# Patient Record
Sex: FEMALE | Race: WHITE | NOT HISPANIC OR LATINO | Employment: UNEMPLOYED | URBAN - METROPOLITAN AREA
[De-identification: names, ages, dates, MRNs, and addresses within clinical notes are randomized per-mention and may not be internally consistent; named-entity substitution may affect disease eponyms.]

---

## 2023-05-15 DIAGNOSIS — E03.9 HYPOTHYROIDISM, UNSPECIFIED: ICD-10-CM

## 2023-05-15 RX ORDER — LIOTHYRONINE SODIUM 5 UG/1
TABLET ORAL
Qty: 120 TABLET | Refills: 2 | Status: SHIPPED | OUTPATIENT
Start: 2023-05-15 | End: 2023-07-12

## 2023-05-15 RX ORDER — LIOTHYRONINE SODIUM 5 UG/1
1 TABLET ORAL 4 TIMES DAILY
COMMUNITY
Start: 2017-12-07 | End: 2023-10-06 | Stop reason: SDUPTHER

## 2023-05-20 DIAGNOSIS — F41.9 ANXIETY: Primary | ICD-10-CM

## 2023-05-22 RX ORDER — DULOXETIN HYDROCHLORIDE 30 MG/1
CAPSULE, DELAYED RELEASE ORAL
Qty: 180 CAPSULE | Refills: 2 | Status: SHIPPED | OUTPATIENT
Start: 2023-05-22 | End: 2023-05-25 | Stop reason: SDUPTHER

## 2023-05-22 RX ORDER — DULOXETIN HYDROCHLORIDE 60 MG/1
1 CAPSULE, DELAYED RELEASE ORAL DAILY
COMMUNITY
Start: 2020-10-22 | End: 2023-07-12

## 2023-05-24 DIAGNOSIS — F41.9 ANXIETY: ICD-10-CM

## 2023-05-25 DIAGNOSIS — Z00.00 ENCOUNTER FOR HEALTH MAINTENANCE EXAMINATION: ICD-10-CM

## 2023-05-25 DIAGNOSIS — Z12.31 ENCOUNTER FOR SCREENING MAMMOGRAM FOR MALIGNANT NEOPLASM OF BREAST: ICD-10-CM

## 2023-05-30 RX ORDER — DULOXETIN HYDROCHLORIDE 30 MG/1
30 CAPSULE, DELAYED RELEASE ORAL 2 TIMES DAILY
Qty: 180 CAPSULE | Refills: 2 | Status: SHIPPED | OUTPATIENT
Start: 2023-05-30 | End: 2024-03-22 | Stop reason: ALTCHOICE

## 2023-07-12 DIAGNOSIS — G47.00 INSOMNIA, UNSPECIFIED: ICD-10-CM

## 2023-07-12 PROBLEM — H16.229 KERATOCONJUNCTIVITIS SICCA, NOT SPECIFIED AS SJOGREN'S: Status: ACTIVE | Noted: 2023-07-12

## 2023-07-12 PROBLEM — M75.81 TENDINITIS OF RIGHT ROTATOR CUFF: Status: RESOLVED | Noted: 2023-07-12 | Resolved: 2023-07-12

## 2023-07-12 PROBLEM — M25.561 RIGHT KNEE PAIN: Status: RESOLVED | Noted: 2023-07-12 | Resolved: 2023-07-12

## 2023-07-12 PROBLEM — R82.998 CASTS URINARY HYALINE: Status: RESOLVED | Noted: 2023-07-12 | Resolved: 2023-07-12

## 2023-07-12 PROBLEM — H52.03 HYPEROPIA OF BOTH EYES: Status: ACTIVE | Noted: 2023-07-12

## 2023-07-12 PROBLEM — H92.09 OTALGIA: Status: ACTIVE | Noted: 2022-08-26

## 2023-07-12 PROBLEM — R53.82 CHRONIC FATIGUE: Status: RESOLVED | Noted: 2023-07-12 | Resolved: 2023-07-12

## 2023-07-12 PROBLEM — E55.9 MILD VITAMIN D DEFICIENCY: Status: ACTIVE | Noted: 2023-07-12

## 2023-07-12 PROBLEM — M17.12 OSTEOARTHRITIS OF LEFT KNEE: Status: ACTIVE | Noted: 2023-07-12

## 2023-07-12 PROBLEM — M25.511 SHOULDER PAIN, RIGHT: Status: RESOLVED | Noted: 2023-07-12 | Resolved: 2023-07-12

## 2023-07-12 PROBLEM — I10 HYPERTENSION: Status: ACTIVE | Noted: 2023-07-12

## 2023-07-12 PROBLEM — R73.09 ELEVATED GLYCOHEMOGLOBIN: Status: ACTIVE | Noted: 2023-07-12

## 2023-07-12 PROBLEM — H90.3 BILATERAL SENSORINEURAL HEARING LOSS: Status: ACTIVE | Noted: 2023-07-12

## 2023-07-12 PROBLEM — G47.9 SLEEP DISTURBANCE: Status: ACTIVE | Noted: 2023-07-12

## 2023-07-12 PROBLEM — H52.4 BILATERAL PRESBYOPIA: Status: RESOLVED | Noted: 2023-07-12 | Resolved: 2023-07-12

## 2023-07-12 PROBLEM — M10.9 GOUT: Status: ACTIVE | Noted: 2022-08-26

## 2023-07-12 PROBLEM — D12.6 ADENOMATOUS POLYP OF COLON: Status: RESOLVED | Noted: 2023-07-12 | Resolved: 2023-07-12

## 2023-07-12 PROBLEM — E87.5 HYPERKALEMIA: Status: ACTIVE | Noted: 2023-07-12

## 2023-07-12 RX ORDER — DICLOFENAC SODIUM 10 MG/G
GEL TOPICAL
COMMUNITY
Start: 2023-01-22 | End: 2024-03-22 | Stop reason: SDUPTHER

## 2023-07-12 RX ORDER — LIFITEGRAST 50 MG/ML
SOLUTION/ DROPS OPHTHALMIC 2 TIMES DAILY
COMMUNITY
Start: 2023-04-27 | End: 2024-03-22 | Stop reason: SDUPTHER

## 2023-07-12 RX ORDER — LEVOTHYROXINE SODIUM 125 UG/1
125 TABLET ORAL DAILY
COMMUNITY
Start: 2023-04-19 | End: 2024-04-15

## 2023-07-12 RX ORDER — TRAZODONE HYDROCHLORIDE 50 MG/1
25 TABLET ORAL NIGHTLY
COMMUNITY
End: 2023-10-06 | Stop reason: SDUPTHER

## 2023-07-12 RX ORDER — ALLOPURINOL 300 MG/1
300 TABLET ORAL DAILY
COMMUNITY
End: 2023-10-06 | Stop reason: ALTCHOICE

## 2023-07-13 RX ORDER — TRAZODONE HYDROCHLORIDE 50 MG/1
TABLET ORAL
Qty: 45 TABLET | Refills: 3 | Status: SHIPPED | OUTPATIENT
Start: 2023-07-13 | End: 2024-07-12

## 2023-09-23 LAB
ALANINE AMINOTRANSFERASE (SGPT) (U/L) IN SER/PLAS: 24 U/L (ref 7–45)
ALBUMIN (G/DL) IN SER/PLAS: 4.2 G/DL (ref 3.4–5)
ALKALINE PHOSPHATASE (U/L) IN SER/PLAS: 93 U/L (ref 33–110)
ANION GAP IN SER/PLAS: 13 MMOL/L (ref 10–20)
ASPARTATE AMINOTRANSFERASE (SGOT) (U/L) IN SER/PLAS: 24 U/L (ref 9–39)
BILIRUBIN TOTAL (MG/DL) IN SER/PLAS: 0.6 MG/DL (ref 0–1.2)
CALCIDIOL (25 OH VITAMIN D3) (NG/ML) IN SER/PLAS: 53 NG/ML
CALCIUM (MG/DL) IN SER/PLAS: 9.8 MG/DL (ref 8.6–10.6)
CARBON DIOXIDE, TOTAL (MMOL/L) IN SER/PLAS: 29 MMOL/L (ref 21–32)
CHLORIDE (MMOL/L) IN SER/PLAS: 103 MMOL/L (ref 98–107)
CHOLESTEROL (MG/DL) IN SER/PLAS: 282 MG/DL (ref 0–199)
CHOLESTEROL IN HDL (MG/DL) IN SER/PLAS: 41.2 MG/DL
CHOLESTEROL/HDL RATIO: 6.8
CREATININE (MG/DL) IN SER/PLAS: 0.67 MG/DL (ref 0.5–1.05)
ERYTHROCYTE DISTRIBUTION WIDTH (RATIO) BY AUTOMATED COUNT: 13.2 % (ref 11.5–14.5)
ERYTHROCYTE MEAN CORPUSCULAR HEMOGLOBIN CONCENTRATION (G/DL) BY AUTOMATED: 32.2 G/DL (ref 32–36)
ERYTHROCYTE MEAN CORPUSCULAR VOLUME (FL) BY AUTOMATED COUNT: 92 FL (ref 80–100)
ERYTHROCYTES (10*6/UL) IN BLOOD BY AUTOMATED COUNT: 4.52 X10E12/L (ref 4–5.2)
ESTIMATED AVERAGE GLUCOSE FOR HBA1C: 108 MG/DL
GFR FEMALE: >90 ML/MIN/1.73M2
GLUCOSE (MG/DL) IN SER/PLAS: 103 MG/DL (ref 74–99)
HEMATOCRIT (%) IN BLOOD BY AUTOMATED COUNT: 41.6 % (ref 36–46)
HEMOGLOBIN (G/DL) IN BLOOD: 13.4 G/DL (ref 12–16)
HEMOGLOBIN A1C/HEMOGLOBIN TOTAL IN BLOOD: 5.4 %
LDL: 197 MG/DL (ref 0–99)
LEUKOCYTES (10*3/UL) IN BLOOD BY AUTOMATED COUNT: 6.3 X10E9/L (ref 4.4–11.3)
NON HDL CHOLESTEROL: 241 MG/DL
NRBC (PER 100 WBCS) BY AUTOMATED COUNT: 0 /100 WBC (ref 0–0)
PLATELETS (10*3/UL) IN BLOOD AUTOMATED COUNT: 279 X10E9/L (ref 150–450)
POTASSIUM (MMOL/L) IN SER/PLAS: 4.7 MMOL/L (ref 3.5–5.3)
PROTEIN TOTAL: 6.4 G/DL (ref 6.4–8.2)
SODIUM (MMOL/L) IN SER/PLAS: 140 MMOL/L (ref 136–145)
THYROTROPIN (MIU/L) IN SER/PLAS BY DETECTION LIMIT <= 0.05 MIU/L: 0.33 MIU/L (ref 0.44–3.98)
TRIGLYCERIDE (MG/DL) IN SER/PLAS: 218 MG/DL (ref 0–149)
UREA NITROGEN (MG/DL) IN SER/PLAS: 13 MG/DL (ref 6–23)
VLDL: 44 MG/DL (ref 0–40)

## 2023-10-06 ENCOUNTER — OFFICE VISIT (OUTPATIENT)
Dept: PRIMARY CARE | Facility: CLINIC | Age: 58
End: 2023-10-06
Payer: COMMERCIAL

## 2023-10-06 VITALS
BODY MASS INDEX: 36.68 KG/M2 | SYSTOLIC BLOOD PRESSURE: 120 MMHG | DIASTOLIC BLOOD PRESSURE: 80 MMHG | HEART RATE: 106 BPM | WEIGHT: 207 LBS | HEIGHT: 63 IN | OXYGEN SATURATION: 98 % | TEMPERATURE: 97.4 F

## 2023-10-06 DIAGNOSIS — E03.9 ACQUIRED HYPOTHYROIDISM: Primary | ICD-10-CM

## 2023-10-06 DIAGNOSIS — Z00.00 ROUTINE GENERAL MEDICAL EXAMINATION AT A HEALTH CARE FACILITY: ICD-10-CM

## 2023-10-06 DIAGNOSIS — M1A.0790 IDIOPATHIC CHRONIC GOUT OF ANKLE WITHOUT TOPHUS, UNSPECIFIED LATERALITY: ICD-10-CM

## 2023-10-06 DIAGNOSIS — Z23 FLU VACCINE NEED: ICD-10-CM

## 2023-10-06 PROBLEM — M79.7 FIBROMYALGIA: Status: ACTIVE | Noted: 2022-08-26

## 2023-10-06 PROCEDURE — 3074F SYST BP LT 130 MM HG: CPT | Performed by: FAMILY MEDICINE

## 2023-10-06 PROCEDURE — 3079F DIAST BP 80-89 MM HG: CPT | Performed by: FAMILY MEDICINE

## 2023-10-06 PROCEDURE — 99396 PREV VISIT EST AGE 40-64: CPT | Performed by: FAMILY MEDICINE

## 2023-10-06 PROCEDURE — 90471 IMMUNIZATION ADMIN: CPT | Performed by: FAMILY MEDICINE

## 2023-10-06 PROCEDURE — 90686 IIV4 VACC NO PRSV 0.5 ML IM: CPT | Performed by: FAMILY MEDICINE

## 2023-10-06 RX ORDER — CELECOXIB 200 MG/1
200 CAPSULE ORAL DAILY
Qty: 90 CAPSULE | Refills: 1 | Status: SHIPPED | OUTPATIENT
Start: 2023-10-06 | End: 2024-01-16

## 2023-10-06 RX ORDER — LIOTHYRONINE SODIUM 5 UG/1
5 TABLET ORAL 3 TIMES DAILY
Qty: 270 TABLET | Refills: 1 | Status: SHIPPED | OUTPATIENT
Start: 2023-10-06

## 2023-10-06 ASSESSMENT — PAIN SCALES - GENERAL: PAINLEVEL: 4

## 2023-10-06 ASSESSMENT — ENCOUNTER SYMPTOMS
DIARRHEA: 0
ABDOMINAL PAIN: 0
WEAKNESS: 0
BLOOD IN STOOL: 0
EYE PAIN: 0
PALPITATIONS: 0
HALLUCINATIONS: 0
MYALGIAS: 1
FREQUENCY: 0
HEADACHES: 0
FATIGUE: 0
DYSURIA: 0
COUGH: 0
HEMATURIA: 0
TROUBLE SWALLOWING: 0
ARTHRALGIAS: 1
JOINT SWELLING: 0
UNEXPECTED WEIGHT CHANGE: 0
DECREASED CONCENTRATION: 0
SORE THROAT: 0
NAUSEA: 0
SHORTNESS OF BREATH: 0
CONFUSION: 0
FEVER: 0
VOMITING: 0
DIZZINESS: 0
NUMBNESS: 0

## 2023-10-06 ASSESSMENT — PATIENT HEALTH QUESTIONNAIRE - PHQ9
SUM OF ALL RESPONSES TO PHQ9 QUESTIONS 1 AND 2: 0
1. LITTLE INTEREST OR PLEASURE IN DOING THINGS: NOT AT ALL
2. FEELING DOWN, DEPRESSED OR HOPELESS: NOT AT ALL

## 2023-10-06 NOTE — PROGRESS NOTES
Subjective   Patient ID: Parvin Garibay is a 58 y.o. female.    Patient comes in today for physical exam.  There has been no chest pain, shortness of breath, fever, chills, unexplained weight loss, rectal bleeding or any other unusual symptoms.  BMI 36.67. Has chronic medical issues, reviewed.  Hx fibromyalgia, tapering off cymbalta, slowly.  Recent labs, diagnostics and pertinent information has been reviewed. Patient is attempting to eat a healthy diet and incorporate exercise in to their lifestyle. Patient does not smoke. Alcohol in moderation. Patient is practicing routine eye and dental care. Reviewed employment status. No uncontrolled anxiety, depression. Reviewed current medications, if any. Hx gout, wants to stop allopurinol.          Review of Systems   Constitutional:  Negative for fatigue, fever and unexpected weight change.   HENT:  Negative for congestion, ear pain, hearing loss, sore throat and trouble swallowing.    Eyes:  Negative for pain and visual disturbance.   Respiratory:  Negative for cough and shortness of breath.    Cardiovascular:  Negative for chest pain, palpitations and leg swelling.   Gastrointestinal:  Negative for abdominal pain, blood in stool, diarrhea, nausea and vomiting.   Genitourinary:  Negative for dysuria, frequency, hematuria and urgency.   Musculoskeletal:  Positive for arthralgias and myalgias. Negative for joint swelling.   Skin:  Negative for pallor and rash.   Neurological:  Negative for dizziness, syncope, weakness, numbness and headaches.   Psychiatric/Behavioral:  Negative for confusion, decreased concentration, hallucinations and suicidal ideas.      Vitals:    10/06/23 0757   BP: 120/80   Pulse: 106   Temp: 36.3 °C (97.4 °F)   SpO2: 98%      Objective   Physical Exam  Constitutional:       Appearance: Normal appearance. She is obese.   HENT:      Head: Normocephalic and atraumatic.      Right Ear: Tympanic membrane and external ear normal.      Left Ear: Tympanic  membrane and external ear normal.      Nose: Nose normal.      Mouth/Throat:      Mouth: Mucous membranes are moist.      Pharynx: Oropharynx is clear. No oropharyngeal exudate.   Eyes:      Extraocular Movements: Extraocular movements intact.      Conjunctiva/sclera: Conjunctivae normal.      Pupils: Pupils are equal, round, and reactive to light.   Cardiovascular:      Rate and Rhythm: Normal rate and regular rhythm.      Heart sounds: Normal heart sounds.   Pulmonary:      Effort: Pulmonary effort is normal.      Breath sounds: Normal breath sounds.   Abdominal:      General: Abdomen is flat.      Palpations: Abdomen is soft. There is no mass.      Tenderness: There is no abdominal tenderness. There is no guarding.   Musculoskeletal:      Cervical back: Neck supple.   Lymphadenopathy:      Cervical: No cervical adenopathy.   Skin:     General: Skin is warm and dry.   Neurological:      General: No focal deficit present.      Mental Status: She is alert.   Psychiatric:         Mood and Affect: Mood normal.         Speech: Speech normal.         Behavior: Behavior normal.         Cognition and Memory: Cognition normal.         Assessment/Plan   Diagnoses and all orders for this visit:  Acquired hypothyroidism  -     liothyronine (Cytomel) 5 mcg tablet; Take 1 tablet (5 mcg) by mouth 3 times a day.  -     TSH; Future  -     Colonoscopy Screening; Future  Idiopathic chronic gout of ankle without tophus, unspecified laterality  -     celecoxib (CeleBREX) 200 mg capsule; Take 1 capsule (200 mg) by mouth once daily.  Routine general medical examination at a health care facility  Flu vaccine need  -     Flu vaccine (IIV4) age 6 months and greater, preservative free

## 2023-10-06 NOTE — PATIENT INSTRUCTIONS
It was nice to see you today!  Discussed current concerns and addressed   Reviewed recent labs and diagnostics  Reviewed medications list  Continue to eat a healthy diet, exercise at least 3 times a week or more  Plan and follow up discussed  For any further information related to your condition, copy and paste or go to [email or web address]  Flu shot  Stop allopurinol  Start black cherry capsules, gout diet

## 2023-11-06 ENCOUNTER — TELEPHONE (OUTPATIENT)
Dept: PRIMARY CARE | Facility: CLINIC | Age: 58
End: 2023-11-06
Payer: COMMERCIAL

## 2023-11-06 NOTE — TELEPHONE ENCOUNTER
Nurys from Speech Therapy dept at [facility]-  Patient contacted her to start therapy for Cognition and Memory issues-it was recommended by her Mental health therapist but they need a referral from her primary care provider to be able to do this for her. Would like to know if Dr. Gupta would be willing to write the referral?     Phone [phone]    If so fax to [phone] Attn: Nurys

## 2023-11-08 DIAGNOSIS — R41.89 COGNITIVE CHANGES: Primary | ICD-10-CM

## 2023-11-09 DIAGNOSIS — Z00.00 ROUTINE GENERAL MEDICAL EXAMINATION AT A HEALTH CARE FACILITY: ICD-10-CM

## 2023-11-18 DIAGNOSIS — M10.9 GOUT, UNSPECIFIED: ICD-10-CM

## 2023-11-20 RX ORDER — ALLOPURINOL 300 MG/1
300 TABLET ORAL DAILY
Qty: 90 TABLET | Refills: 3 | Status: SHIPPED | OUTPATIENT
Start: 2023-11-20

## 2023-12-02 ENCOUNTER — LAB (OUTPATIENT)
Dept: LAB | Facility: LAB | Age: 58
End: 2023-12-02
Payer: COMMERCIAL

## 2023-12-02 DIAGNOSIS — E03.9 ACQUIRED HYPOTHYROIDISM: ICD-10-CM

## 2023-12-02 LAB — TSH SERPL-ACNC: 0.3 MIU/L (ref 0.44–3.98)

## 2023-12-02 PROCEDURE — 36415 COLL VENOUS BLD VENIPUNCTURE: CPT

## 2023-12-02 PROCEDURE — 84443 ASSAY THYROID STIM HORMONE: CPT

## 2024-01-02 NOTE — PROGRESS NOTES
Patient: Parvin Garibay    [id number]  : 1965 -- AGE 58 y.o.    Provider: Nabeel Edwards MD PhD     Location  [facility]   Service Date: 1/3/2024              [facility]  New Visit Note      HISTORY OF PRESENT ILLNESS     The patient's referring provider is: Mayela Gupta MD     REASON FOR VISIT: No chief complaint on file.       HISTORY OF PRESENT ILLNESS   Ms. Parvin Garibay is a 58 y.o. female with past medical history of Sjogren's, gout, HLD, HTN, hypothyroid, fibromyalgia who presents to a [facility] for a sleep medicine evaluation with concerns of sleep apnea.     PAST SLEEP HISTORY  She had a sleep study in  but we do not have results, but there is some mention in clinic notes. There may have been a PSG on 14 that showed moderate WHITNEY with an AHI of 26.6/h. A CPAP study was done on 5/20/15 but patient only seemed to use CPAP for a brief period and study was terminated.      CURRENT HISTORY  On today's visit, the patient reports a prior dx of WHITNEY. She feels her CPAP could be. Her wife has WHITNEY and is using CPAP.  She currently snores. It will get worse when cogested. Witnessed apneas have not been seen. Some choking/gasping episodes as well.     Over the past five years, the patient's weight has  not significantly changed.    She does take trazodone at 25mg to help her sleep. Helps with sleep onset. She has AM symptoms of grogginess with it.    Sleep schedule:  In bed: 9-10pm  Activities in bed:   Bedtime Activities: watch TV; sometimes read  Subjective sleep latency:  30-60 min  Awakenings during night: 1-2x for BR  Length of awakenings: < 15 min unless after 4AM  Final awakening time: 6AM (sometimes 3-4AM) - wakes on own  Out of bed: will get up when wakes up  Overall estimate of total sleep time: 6-7h    Weekends: similar  Preferred sleeping position: prone  Typically sleeps  with her wife .       Associated  sleep symptoms:  Breathing during sleep: snoring  Behaviors at night: No   Sleep paralysis: No   Hypnogogic or hypnopompic hallucinations: No   Cataplexy: No     Leg symptoms and timing:  - Sensations: Patient does not have unusual sensations in their extremities that cause an urge to move them       Sleep environment:  Pets in bed :  No - moved dog out of BR  Bedroom temperature: WNL  Noise :   No   Issues with bed comfort :   No     Daytime Symptoms:  On awakening patient reports: feels sleepy, morning dry mouth, and hard to get out of bed    Daytime: During the day, lack of sleep makes her more prone to errors. She is an ..    Driving History: she has a 30m commute from home to work. The patient typically drives to work.  She is not a . With driving, the patient denies sleepy driving, with 0 sleepiness-related motor vehicle accidents and 0 near misses.      ESS: 3  MARTY: 13  FOSQ:  27      REVIEW OF SYSTEMS     REVIEW OF SYSTEMS  Review of Systems   All other systems reviewed and are negative.      ALLERGIES AND MEDICATIONS     ALLERGIES  Allergies   Allergen Reactions    Rosuvastatin Other    Simvastatin Other    Statins-Hmg-Coa Reductase Inhibitors Other    Penicillins Rash    Sulfa (Sulfonamide Antibiotics) Rash    Sulfadiazine Rash       MEDICATIONS  Current Outpatient Medications   Medication Sig Dispense Refill    allopurinol (Zyloprim) 300 mg tablet TAKE 1 TABLET BY MOUTH EVERY DAY 90 tablet 3    celecoxib (CeleBREX) 200 mg capsule Take 1 capsule (200 mg) by mouth once daily. 90 capsule 1    diclofenac sodium (Voltaren) 1 % gel gel APPLY A SMALL AMOUNT TO THE PAINFUL AREA 3 TIMES A DAY AS NEEDED      DULoxetine (Cymbalta) 30 mg DR capsule Take 1 capsule (30 mg) by mouth 2 times a day. Do not crush or chew. 180 capsule 2    levothyroxine (Synthroid, Levoxyl) 125 mcg tablet Take 1 tablet (125 mcg) by mouth once daily.      liothyronine (Cytomel) 5 mcg tablet Take 1 tablet (5 mcg)  "by mouth 3 times a day. 270 tablet 1    traZODone (Desyrel) 50 mg tablet TAKE 1/2 TABLET BY MOUTH AT BEDTIME 45 tablet 3    Xiidra 5 % dropperette Administer into affected eye(s) twice a day.       No current facility-administered medications for this visit.         PAST HISTORY     PAST MEDICAL HISTORY  She  has a past medical history of Adenomatous polyp of colon (07/12/2023), Autoimmune thyroiditis (08/11/2014), Bilateral presbyopia (07/12/2023), Casts urinary hyaline (07/12/2023), Dysthymic disorder (08/06/2014), Encounter for other screening for malignant neoplasm of breast (10/22/2020), Other conditions influencing health status (06/18/2015), Personal history of other diseases of the respiratory system, Personal history of other endocrine, nutritional and metabolic disease (06/18/2015), Personal history of other endocrine, nutritional and metabolic disease, Personal history of other endocrine, nutritional and metabolic disease, Right knee pain (07/12/2023), and Tendinitis of right rotator cuff (07/12/2023).      PAST SURGICAL HISTORY:  Past Surgical History:   Procedure Laterality Date    OTHER SURGICAL HISTORY  04/05/2019    No history of surgery       FAMILY HISTORY  No family history on file.  She does not have a family history of sleep disorder (e.g., sleep apnea, narcolepsy in any first degree relatives). Father was a loud snorer but never tested      SOCIAL HISTORY  She  reports that she has quit smoking. Her smoking use included cigarettes. She has never used smokeless tobacco. No history on file for alcohol use and drug use. She currently lives with a partner. She works as an .    Caffeine consumption: Yesm- 2/day  Alcohol consumption: No  Smoking: No - quit 2013  Marijuana: No      PHYSICAL EXAM     Physical Examination: /81   Pulse 81   Temp 36.4 °C (97.5 °F)   Resp 18   Ht 1.6 m (5' 3\")   Wt 122 kg (270 lb)   SpO2 96%   BMI 47.83 kg/m²     PREVIOUS WEIGHTS:  Wt Readings " "from Last 3 Encounters:   01/03/24 122 kg (270 lb)   10/06/23 93.9 kg (207 lb)   01/27/23 113 kg (250 lb)       General: The patient is a pleasant female, in no acute distress. HEENT: She has a  a modified Mallampati grade 3 airway with Numbers; 0-4 (with +): 1+ tonsils bilaterally. The soft palate was normal and the uvula was normal. The A/P diameter of the velopharynx was narrowed. The oropharynx was not shallow in the A/P diameter. Lateral wall narrowing was present. Tongue ridging was notpresent. Erythema of the posterior pharynx was not present. Mucosal hypertrophy in the posterior oropharynx was not present. Retrognathia was not and micrognathia was not present. Neck: The neck was not enlarged. No JVP, bruits or lymphadenopathy was appreciated. Chest: Clear to auscultation. No wheezes, rales, or rhonchi. Cardiovascular: Regular rate and rhythm. No murmurs, gallops, or clicks. Abdomen: Soft, nontender, nondistended. Positive bowel sounds. Extremities: No clubbing, cyanosis, or edema is noted. Neurologic exam: Alert, oriented x3 and was grossly non-focal.    RESULTS/DATA     No results found for: \"IRON\", \"TRANSFERRIN\", \"IRONSAT\", \"TIBC\", \"FERRITIN\"    Bicarbonate (mmol/L)   Date Value   09/23/2023 29   06/20/2022 30   01/04/2022 31       DIAGNOSES     Problem List and Orders  Diagnoses and all orders for this visit:  Chronic insomnia  WHITNEY (obstructive sleep apnea)  -     In-Center Sleep Study (Sleep Provider Only); Future  -     Follow Up In Adult Sleep Medicine; Future  Morbid obesity (CMS/LTAC, located within [facility])        ASSESSMENT/PLAN     Ms. Garibay is a 58 y.o. female and with past medical history of Sjogren's, gout, HLD, HTN, hypothyroid, fibromyalgia She presents to  the [facility] to address her tiredness.    Snoring, possible WHITNEY. Ms. Garibay is evaluated for loud snoring in the context of an elevated body mass index.  This raises concerns for obstructive sleep apnea (WHTINEY). Given this, she " should be scheduled for an overnight sleep study. The causes and potential consequences of WHITNEY were discussed in detail with the patient.      We discussed what a sleep study (PSG) involves. She was informed that if severe WHITNEY was identified, the sleep study would include a CPAP titration. The patient was informed that if less severe WHITNEY is identified, that a second sleep study may be needed for a PAP titration study. If PAP is started for a diagnosis of WHITNEY,  She  was informed that she would need to return to the clinic for a follow-up in 1-3 months to assess initial response to PAP, address any PAP-related issue, and document PAP adherence. Other treatments including weight loss, positional therapy, surgical therapy, and oral appliances were also discussed.     Chronic insomnia.  She has had issues with difficulties falling and staying asleep.  She has been using trazodone which helps her grogginess in the morning.  Fall asleep but has some residual she has cut back down to 25 mg.  If she does not have significant sleep apnea then we might consider whether she should be referred for cognitive behavioral therapy or consideration for another sleep aid.      Obesity/Overweight.  The patient was counseled that her weight is the strongest modifiable risk factor and contributor for WHITNEY. She was counseled to consider weight loss options to include changes in dietary habits and activity. We briefly discussed the use of calorie tracking smartphone apps and the use of activity meters to provide feedback that helps in losing weight.  Before initiating an exercise plan, she should carefully review the approach with her primary care provider.       Follow up: after sleep study         Nabeel Edwards MD PhD

## 2024-01-03 ENCOUNTER — OFFICE VISIT (OUTPATIENT)
Dept: SLEEP MEDICINE | Facility: CLINIC | Age: 59
End: 2024-01-03
Payer: COMMERCIAL

## 2024-01-03 VITALS
BODY MASS INDEX: 47.84 KG/M2 | SYSTOLIC BLOOD PRESSURE: 149 MMHG | WEIGHT: 270 LBS | HEIGHT: 63 IN | OXYGEN SATURATION: 96 % | HEART RATE: 81 BPM | RESPIRATION RATE: 18 BRPM | TEMPERATURE: 97.5 F | DIASTOLIC BLOOD PRESSURE: 81 MMHG

## 2024-01-03 DIAGNOSIS — E66.01 MORBID OBESITY (MULTI): ICD-10-CM

## 2024-01-03 DIAGNOSIS — F51.04 CHRONIC INSOMNIA: Primary | ICD-10-CM

## 2024-01-03 DIAGNOSIS — G47.33 OSA (OBSTRUCTIVE SLEEP APNEA): ICD-10-CM

## 2024-01-03 PROCEDURE — 99204 OFFICE O/P NEW MOD 45 MIN: CPT | Performed by: INTERNAL MEDICINE

## 2024-01-03 PROCEDURE — 3079F DIAST BP 80-89 MM HG: CPT | Performed by: INTERNAL MEDICINE

## 2024-01-03 PROCEDURE — 1036F TOBACCO NON-USER: CPT | Performed by: INTERNAL MEDICINE

## 2024-01-03 PROCEDURE — 3077F SYST BP >= 140 MM HG: CPT | Performed by: INTERNAL MEDICINE

## 2024-01-03 PROCEDURE — 99214 OFFICE O/P EST MOD 30 MIN: CPT | Performed by: INTERNAL MEDICINE

## 2024-01-03 ASSESSMENT — SLEEP AND FATIGUE QUESTIONNAIRES
HOW LIKELY ARE YOU TO NOD OFF OR FALL ASLEEP WHILE SITTING AND TALKING TO SOMEONE: NO CHANCE OF DOZING
ESS TOTAL SCORE: 3
HOW LIKELY ARE YOU TO NOD OFF OR FALL ASLEEP WHILE LYING DOWN TO REST IN THE AFTERNOON WHEN CIRCUMSTANCES PERMIT: HIGH CHANCE OF DOZING
HOW LIKELY ARE YOU TO NOD OFF OR FALL ASLEEP WHILE SITTING QUIETLY AFTER LUNCH WITHOUT ALCOHOL: NO CHANCE OF DOZING
SITING INACTIVE IN A PUBLIC PLACE LIKE A CLASS ROOM OR A MOVIE THEATER: NO CHANCE OF DOZING
HOW LIKELY ARE YOU TO NOD OFF OR FALL ASLEEP IN A CAR, WHILE STOPPED FOR A FEW MINUTES IN TRAFFIC: NO CHANCE OF DOZING
HOW LIKELY ARE YOU TO NOD OFF OR FALL ASLEEP WHEN YOU ARE A PASSENGER IN A CAR FOR AN HOUR WITHOUT A BREAK: NO CHANCE OF DOZING
HOW LIKELY ARE YOU TO NOD OFF OR FALL ASLEEP WHILE WATCHING TV: NO CHANCE OF DOZING
HOW LIKELY ARE YOU TO NOD OFF OR FALL ASLEEP WHILE SITTING AND READING: NO CHANCE OF DOZING

## 2024-01-03 ASSESSMENT — PAIN SCALES - GENERAL: PAINLEVEL: 0-NO PAIN

## 2024-01-03 NOTE — PATIENT INSTRUCTIONS
[facility]  [facility]   [facility]  [address] [address]    [facility]  [address]  [facility] [id number] [phone]   [facility]  [address]  [facility] [address]           NAME: Parvin Garibay   DATE: 1/3/2024     Your Sleep Provider Today: Nabeel Edwards MD PhD  Your Primary Care Physician: Mayela Gupta MD   Your Referring Provider: No ref. provider found    Thank you for coming to the Sleep Medicine Clinic today! Your sleep medicine provider today was: Nabeel Edwards MD PhD Below is a summary of your treatment plan, other important information, and our contact numbers:  If you need to schedule an appointment, please call [phone])  If you need general assistance (e.g. forms completed, general questions), please call my , Sole, at [phone].  If you have a medical question about your sleep issues, please contact our nurses, Lissett or Krystle at [phone].   You can also contact us through [employer].      DIAGNOSIS:   1. Chronic insomnia        2. WHITNEY (obstructive sleep apnea)                TREATMENT PLAN     Scheduling a Sleep Study    Call the  Sleep Testing Center to speak with a sleep testing  to book your overnight sleep study procedure at one of our adult and pediatric-friendly sleep labs. Overnight sleep studies may be scheduled on a weekday or weekend.    We have child life services on a case by case basis at the [facility] location. We also perform daytime testing for shift workers on a case by case basis.    Locations for sleep studies are: [place], [place], [facility] ([facility]), [place], [place], [facility], [place], [facility], [place], [place].    Bring your usual medications and nightly routine items for your sleep study. In order to fall asleep faster in sleep lab, we  advise patients to wake up earlier on the morning of the scheduled testing and avoid napping prior to testing. Sometimes, your provider may prescribe a sleep aid to be taken at lights out in the sleep lab. If you are taking a sleep aid, please have somebody pick you up after the sleep testing.    Results of your sleep study will be given to the ordering clinician. Please contact their office for results or follow up as directed by your clinician.    For additional information about the sleep medicine services, please call [phone]       Follow-up Appointment:   Followup with me in after sleep study.      IMPORTANT INFORMATION     Call 911 for medical emergencies.  Our offices are generally open from Monday-Friday, 9 am - 5 pm.  If you need to get in touch with me, you may either call me and my team(number is below) or you can use [employer].  If a referral for a test, for CPAP, or for another specialist was made, and you have not heard about scheduling this within a week, please call scheduling at [phone] ([phone]).  If you are unable to make your appointment for clinic or an overnight study, kindly call the office at least 48 hours in advance to cancel and reschedule.  If you are on CPAP, please bring your device's card or the device to each clinic appointment.   There are no supporting services by either the sleep doctors or their staff on weekends and Holidays, or after 5 PM on weekdays.   If you have been asked to come to a sleep study, make sure you bring toiletries, a comfy pillow, and any nighttime medications that you may regularly take. Also be sure to eat dinner before you arrive. We generally do not provide meals.      PRESCRIPTIONS     We require 7 days advanced notice for prescription refills. If we do not receive the request in this time, we cannot guarantee that your medication will be refilled in time.      IMPORTANT PHONE NUMBERS      scheduling for medical testin618 - [phone]    Sleep Medicine Clinic Fax: [phone]  Appointments (for Pediatric Sleep Clinic): [phone] - option 1  Appointments (for Adult Sleep Clinic): [phone] - option 2  Appointments (For Sleep Studies): [phone] - option 3  Behavioral Sleep Medicine: [phone]  Sleep Surgery: [phone]  ENT (Otolaryngology): [phone]  Headache Clinic (Neurology): [phone]  Neurology: [phone]  Psychiatry: [phone]  Pulmonary Function Testing (PFT) Center: [phone]  Pulmonary Medicine: [phone]  [employer] (DME): [phone]  [employer] (DME): [phone]  [facility] ([facility]): [phone]      OUR ADULT SLEEP MEDICINE TEAM   Please do not hesitate to call the office or sleep nurse with any questions between appointments:    Adult Sleep Nurses (Krystle Lam, RN and Lissett Putnam RN):  For clinical questions and refilling prescriptions: [phone]  Email sleep diaries and other documents at: [email or web address]    Adult Sleep Medicine Secretaries:  Anna Abreu (For [place]/Lr/Krise/Strohl/Yeh/Lane):   P: [phone]  F: [phone]  Sole Good (For Edwards/Guggenbiller): P: [phone]  Fax: [phone]  Rebeca Robb (For Jurcevic/Blank): P: [phone]  F: [phone]  Gabbi Lazo (For Cami): P: [phone]  F: [phone]  Lucy Alfredo (For Elsa/Юлия/Zakhary): P: [phone]  F: [phone]  Deann Tate (For Gandhi/Solano): P: [phone]  F: [phone]     Adult Sleep Medicine Advanced Practice Providers:  Raleigh Anthony ([place], [place])  Laurita Redman ([facility])  Shikha Olivo CNP ([place], [place], [facility])  Greer Middleton CNP ([place], [place], [facility])  Leticia Almanza ([place], [place], [facility])  Festus Solano CNP ([place])        OUR SLEEP TESTING LOCATIONS     Our team will contact you to schedule your sleep study, however, you  "can contact us as follow:  Main Phone Line (scheduling only): [phone] ([phone]), option 3  Adult and Pediatric Locations   [place] (6 years and older): [facility] - 4th floor ([address] [facility]) After hours line: [phone]  [facility] at [facility] (Main campus: All ages): [facility], 6th floor. After hours line: [phone]   [place] (5 years and older; younger considered on case-by-case basis): [address]; [address], [address]. Scheduling  After hours line: [phone]   [place] (6 years and older): [address]; [address]; [address]   [place] (6 years and older): [address] [facility], [address]  After hours line: [phone]   [place] (13 years and older) in [place]: [address], 2nd floor  After hours line: [phone]   [place] (13 year and older): [address]  After hours line: [phone]     Adult Only Locations:   [place] (18 years and older): [address], 2nd floor   [place] (18 years and older): [address] [facility]; 4th floor  After hours line: [phone]   [place] (18 years and older) at [place]: [address] [place] [address]  After hours line: [phone]          CONTACTING YOUR SLEEP MEDICINE PROVIDER     Send a message directly to your provider through \"My Chart\", which is the email service through your  Records Account: [email or web address]   Call [phone] and leave a message. One of the administrative assistants will forward the message to your sleep medicine provider through \"My Chart\" and/or email.     Your sleep medicine provider for this visit was: Nabeel Edwards MD PhD        "

## 2024-01-14 DIAGNOSIS — M1A.0790 IDIOPATHIC CHRONIC GOUT OF ANKLE WITHOUT TOPHUS, UNSPECIFIED LATERALITY: ICD-10-CM

## 2024-01-15 PROBLEM — H92.09 PAIN OF EAR STRUCTURE: Status: ACTIVE | Noted: 2024-01-15

## 2024-01-15 PROBLEM — H90.3 SENSORINEURAL HEARING LOSS (SNHL) OF BOTH EARS: Status: ACTIVE | Noted: 2023-07-12

## 2024-01-15 PROBLEM — S89.90XA KNEE INJURY: Status: RESOLVED | Noted: 2024-01-15 | Resolved: 2024-01-15

## 2024-01-15 PROBLEM — M25.519 SHOULDER PAIN: Status: ACTIVE | Noted: 2024-01-15

## 2024-01-15 PROBLEM — R53.83 FATIGUE: Status: ACTIVE | Noted: 2024-01-15

## 2024-01-15 PROBLEM — M79.606 PAIN OF LOWER EXTREMITY: Status: ACTIVE | Noted: 2024-01-15

## 2024-01-15 PROBLEM — R09.81 NASAL CONGESTION: Status: RESOLVED | Noted: 2024-01-15 | Resolved: 2024-01-15

## 2024-01-15 PROBLEM — M75.100 NONTRAUMATIC TEAR OF ROTATOR CUFF: Status: RESOLVED | Noted: 2024-01-15 | Resolved: 2024-01-15

## 2024-01-15 PROBLEM — G47.9 DISTURBANCE IN SLEEP BEHAVIOR: Status: ACTIVE | Noted: 2023-07-12

## 2024-01-15 PROBLEM — S46.919A STRAIN OF SHOULDER: Status: ACTIVE | Noted: 2022-12-12

## 2024-01-15 PROBLEM — M35.01 KERATOCONJUNCTIVITIS SICCA (MULTI): Status: ACTIVE | Noted: 2023-07-12

## 2024-01-15 PROBLEM — E55.9 VITAMIN D DEFICIENCY: Status: ACTIVE | Noted: 2023-07-12

## 2024-01-15 PROBLEM — Z86.39 HISTORY OF THYROID DISORDER: Status: RESOLVED | Noted: 2024-01-15 | Resolved: 2024-01-15

## 2024-01-15 PROBLEM — H91.90 DECREASED HEARING: Status: ACTIVE | Noted: 2024-01-15

## 2024-01-15 PROBLEM — M75.121 NONTRAUMATIC COMPLETE TEAR OF RIGHT ROTATOR CUFF: Status: RESOLVED | Noted: 2024-01-15 | Resolved: 2024-01-15

## 2024-01-15 PROBLEM — Z86.39 HISTORY OF METABOLIC DISORDER: Status: RESOLVED | Noted: 2024-01-15 | Resolved: 2024-01-15

## 2024-01-15 PROBLEM — F51.04 CHRONIC INSOMNIA: Status: ACTIVE | Noted: 2024-01-15

## 2024-01-15 PROBLEM — J01.90 ACUTE SINUSITIS: Status: RESOLVED | Noted: 2024-01-15 | Resolved: 2024-01-15

## 2024-01-15 PROBLEM — H16.229 KERATOCONJUNCTIVITIS SICCA: Status: ACTIVE | Noted: 2023-07-12

## 2024-01-15 PROBLEM — M54.50 LOW BACK PAIN: Status: RESOLVED | Noted: 2024-01-15 | Resolved: 2024-01-15

## 2024-01-15 PROBLEM — R73.09 ELEVATED HEMOGLOBIN A1C: Status: ACTIVE | Noted: 2023-07-12

## 2024-01-15 PROBLEM — M25.579 ANKLE PAIN: Status: RESOLVED | Noted: 2024-01-15 | Resolved: 2024-01-15

## 2024-01-15 PROBLEM — M17.9 OSTEOARTHRITIS OF KNEE: Status: ACTIVE | Noted: 2022-09-09

## 2024-01-15 RX ORDER — LISINOPRIL 10 MG/1
TABLET ORAL
COMMUNITY
Start: 2022-01-27 | End: 2024-04-04 | Stop reason: WASHOUT

## 2024-01-15 RX ORDER — NAPROXEN SODIUM 220 MG
TABLET ORAL
COMMUNITY
End: 2024-03-22 | Stop reason: ALTCHOICE

## 2024-01-15 RX ORDER — COLESEVELAM HYDROCHLORIDE 3.75 G/1
3.75 POWDER, FOR SUSPENSION ORAL
COMMUNITY
Start: 2020-10-22 | End: 2024-04-03 | Stop reason: WASHOUT

## 2024-01-16 RX ORDER — CELECOXIB 200 MG/1
200 CAPSULE ORAL DAILY
Qty: 90 CAPSULE | Refills: 1 | Status: SHIPPED | OUTPATIENT
Start: 2024-01-16 | End: 2024-07-14

## 2024-01-17 ENCOUNTER — TELEPHONE (OUTPATIENT)
Dept: PRIMARY CARE | Facility: CLINIC | Age: 59
End: 2024-01-17
Payer: COMMERCIAL

## 2024-01-17 NOTE — TELEPHONE ENCOUNTER
Patient currently on cymbalta 30 mg, bid. Wanting new script sent at a lower dose of 15mg, once daily. Not sure how this should be weaned. Nothing in chart notes. Please sent whatever her [facility]

## 2024-01-18 DIAGNOSIS — M79.606 PAIN OF LOWER EXTREMITY, UNSPECIFIED LATERALITY: Primary | ICD-10-CM

## 2024-01-18 RX ORDER — DULOXETIN HYDROCHLORIDE 20 MG/1
20 CAPSULE, DELAYED RELEASE ORAL DAILY
Qty: 30 CAPSULE | Refills: 0 | Status: SHIPPED | OUTPATIENT
Start: 2024-01-18 | End: 2024-02-09

## 2024-01-19 ENCOUNTER — APPOINTMENT (OUTPATIENT)
Dept: ORTHOPEDIC SURGERY | Facility: CLINIC | Age: 59
End: 2024-01-19
Payer: COMMERCIAL

## 2024-01-26 ENCOUNTER — APPOINTMENT (OUTPATIENT)
Dept: ORTHOPEDIC SURGERY | Facility: CLINIC | Age: 59
End: 2024-01-26
Payer: COMMERCIAL

## 2024-02-02 ENCOUNTER — OFFICE VISIT (OUTPATIENT)
Dept: ORTHOPEDIC SURGERY | Facility: CLINIC | Age: 59
End: 2024-02-02
Payer: COMMERCIAL

## 2024-02-02 ENCOUNTER — APPOINTMENT (OUTPATIENT)
Dept: ORTHOPEDIC SURGERY | Facility: CLINIC | Age: 59
End: 2024-02-02
Payer: COMMERCIAL

## 2024-02-02 DIAGNOSIS — M75.101 TEAR OF RIGHT ROTATOR CUFF, UNSPECIFIED TEAR EXTENT, UNSPECIFIED WHETHER TRAUMATIC: Primary | ICD-10-CM

## 2024-02-02 PROCEDURE — 99213 OFFICE O/P EST LOW 20 MIN: CPT | Performed by: STUDENT IN AN ORGANIZED HEALTH CARE EDUCATION/TRAINING PROGRAM

## 2024-02-02 PROCEDURE — 1036F TOBACCO NON-USER: CPT | Performed by: STUDENT IN AN ORGANIZED HEALTH CARE EDUCATION/TRAINING PROGRAM

## 2024-02-02 ASSESSMENT — PAIN - FUNCTIONAL ASSESSMENT: PAIN_FUNCTIONAL_ASSESSMENT: 0-10

## 2024-02-02 NOTE — PROGRESS NOTES
Parvin Garibay is a 58 y.o. year-old  female  she comes in today to discuss her shoulder.  She has a known small rotator cuff tear.  She is elected so far to treat this nonsurgically with intermittent injections and the last one we gave her only lasted for about a month.  She still having functional pain with her shoulder weakness especially carrying something overhead.      Past Medical, Family, and Social History reviewed     Review of Systems  A complete review of systems was conducted, pertinent only to the HPI noted above.    Physical Exam  GEN: Alert and Oriented x 3  Constitutional: Well appearing, in no apparent distress.  Eyes: sclera anicteric  ENT: hearing appropriate for normal conversation, neck appears symmetric with no gross thyromegaly  Pulm: No labored breathing, no wheezing  CVS: Regular rate and rhythm  PSY: normal mood and affect  Skin: No rashes, erythema, or induration around shoulder     Focused Musculoskeletal Exam:     Side: Right shoulder:  PROM:   FE (170)   ER 60 ABER/ABIR: 90/90  AROM:   FE (170)   ER 45 IR T8  Strength:  Supra [4/5] Infra [5/5] Subscap [5/5]  Abd [5/5]    Special Tests  Shoulder  Pain with Neer and Rausch and empty can      ACJ:  AC TTP: [neg]  Cross Arm [neg]  AC prominence [no]      [Sensation intact Ax/median/ulnar/radial distributions  Motor intact Ax/median/radial/ulnar/AIN/PIN    X-rays and MRI of the shoulder independently viewed and interpreted: No significant degenerative changes previous MRI was reviewed which demonstrate roughly a 1 cm anterior rotator cuff tendon tear in the setting of some tendinosis this MRI was December 2022    The patient history, physical examination and imaging studies are consistent with the diagnosis above.    At this point she is considering her surgical options.  Given it has been over a year since her MRI I have recommended a repeat MRI to evaluate for tear progression.  This has been ordered she will return to review and  discuss surgical repair of her rotator cuff tear.  All questions were answered she is in agreement with this plan.

## 2024-02-08 ENCOUNTER — APPOINTMENT (OUTPATIENT)
Dept: SLEEP MEDICINE | Facility: CLINIC | Age: 59
End: 2024-02-08
Payer: COMMERCIAL

## 2024-02-08 ENCOUNTER — CLINICAL SUPPORT (OUTPATIENT)
Dept: SLEEP MEDICINE | Facility: CLINIC | Age: 59
End: 2024-02-08
Payer: COMMERCIAL

## 2024-02-08 DIAGNOSIS — G47.33 OSA (OBSTRUCTIVE SLEEP APNEA): ICD-10-CM

## 2024-02-08 PROCEDURE — 95810 POLYSOM 6/> YRS 4/> PARAM: CPT | Performed by: INTERNAL MEDICINE

## 2024-02-09 VITALS
OXYGEN SATURATION: 98 % | RESPIRATION RATE: 10 BRPM | BODY MASS INDEX: 47.62 KG/M2 | WEIGHT: 268.74 LBS | HEIGHT: 63 IN | DIASTOLIC BLOOD PRESSURE: 67 MMHG | SYSTOLIC BLOOD PRESSURE: 123 MMHG

## 2024-02-09 DIAGNOSIS — M79.606 PAIN OF LOWER EXTREMITY, UNSPECIFIED LATERALITY: ICD-10-CM

## 2024-02-09 RX ORDER — DULOXETIN HYDROCHLORIDE 20 MG/1
20 CAPSULE, DELAYED RELEASE ORAL DAILY
Qty: 90 CAPSULE | Refills: 1 | Status: SHIPPED | OUTPATIENT
Start: 2024-02-09 | End: 2024-03-22 | Stop reason: SDUPTHER

## 2024-02-09 NOTE — PROGRESS NOTES
[facility] TECH NOTE:     Patient: Parvin Garibay   MRN//AGE: [id number]  1965  58 y.o.   Technologist: OBINNA MAHONEY   Room: 441B   Service Date: 2024        Sleep Testing Location: [facility]:     TECHNOLOGIST SLEEP STUDY PROCEDURE NOTE:   This sleep study is being conducted according to the policies and procedures outlined by the AAS accreditation standards.  The sleep study procedure and processes involved during this appointment was explained to the patient/patient’s family, questions were answered. The patient/family verbalized understanding.      The patient is a 58 y.o. year old female scheduled for aDiagnostic PSG Split night with montage of:   PSG Master . she arrived for her appointment.      The study that was ultimately completed was aDiagnostic PSG Split night with montage of:   PSG Master .    The full study yes completed.  Patient questionnaires completed?: yes     Consents signed? yes    Initial Fall Risk Screening:     Parvin has not fallen in the last 6 months. her did not result in injury. Parvin does not have a fear of falling. He does not need assistance with sitting, standing, or walking. she does not need assistance walking in her home. she does not need assistance in an unfamiliar setting. The patient is notusing an assistive device.     Brief Study observations: Patient showed up on time for her scheduled study. Arousals, desaturations, respiratory events, snoring and periodic limb movement were observed.  Patient did not meet split night criteria.     Deviation to order/protocol and reason: None     If PAP, which was preferred mask/pressure/mode: None     Other:None    After the procedure, the patient/family was informed to ensure followup with ordering clinician for testing results.      Technologist: OBINNA MAHONEY

## 2024-02-16 ENCOUNTER — HOSPITAL ENCOUNTER (OUTPATIENT)
Dept: RADIOLOGY | Facility: CLINIC | Age: 59
Discharge: HOME | End: 2024-02-16
Payer: COMMERCIAL

## 2024-02-16 DIAGNOSIS — M75.101 TEAR OF RIGHT ROTATOR CUFF, UNSPECIFIED TEAR EXTENT, UNSPECIFIED WHETHER TRAUMATIC: ICD-10-CM

## 2024-02-16 PROCEDURE — 73221 MRI JOINT UPR EXTREM W/O DYE: CPT | Mod: RIGHT SIDE | Performed by: RADIOLOGY

## 2024-02-16 PROCEDURE — 73221 MRI JOINT UPR EXTREM W/O DYE: CPT | Mod: RT

## 2024-02-19 ENCOUNTER — APPOINTMENT (OUTPATIENT)
Dept: PSYCHOLOGY | Facility: HOSPITAL | Age: 59
End: 2024-02-19
Payer: COMMERCIAL

## 2024-03-01 ENCOUNTER — APPOINTMENT (OUTPATIENT)
Dept: ORTHOPEDIC SURGERY | Facility: CLINIC | Age: 59
End: 2024-03-01
Payer: COMMERCIAL

## 2024-03-20 ENCOUNTER — APPOINTMENT (OUTPATIENT)
Dept: SLEEP MEDICINE | Facility: CLINIC | Age: 59
End: 2024-03-20
Payer: COMMERCIAL

## 2024-03-22 ENCOUNTER — OFFICE VISIT (OUTPATIENT)
Dept: PRIMARY CARE | Facility: CLINIC | Age: 59
End: 2024-03-22
Payer: COMMERCIAL

## 2024-03-22 VITALS
DIASTOLIC BLOOD PRESSURE: 80 MMHG | TEMPERATURE: 97.6 F | HEART RATE: 85 BPM | OXYGEN SATURATION: 95 % | WEIGHT: 268 LBS | BODY MASS INDEX: 47.48 KG/M2 | HEIGHT: 63 IN | SYSTOLIC BLOOD PRESSURE: 130 MMHG

## 2024-03-22 DIAGNOSIS — M79.606 PAIN OF LOWER EXTREMITY, UNSPECIFIED LATERALITY: ICD-10-CM

## 2024-03-22 DIAGNOSIS — E03.9 ACQUIRED HYPOTHYROIDISM: ICD-10-CM

## 2024-03-22 DIAGNOSIS — M35.01 KERATOCONJUNCTIVITIS SICCA (MULTI): ICD-10-CM

## 2024-03-22 DIAGNOSIS — B37.9 CANDIDIASIS: ICD-10-CM

## 2024-03-22 DIAGNOSIS — E78.2 MIXED HYPERLIPIDEMIA: Primary | ICD-10-CM

## 2024-03-22 DIAGNOSIS — H04.123 DRY EYES: ICD-10-CM

## 2024-03-22 PROCEDURE — 99214 OFFICE O/P EST MOD 30 MIN: CPT | Performed by: FAMILY MEDICINE

## 2024-03-22 PROCEDURE — 3075F SYST BP GE 130 - 139MM HG: CPT | Performed by: FAMILY MEDICINE

## 2024-03-22 PROCEDURE — 3079F DIAST BP 80-89 MM HG: CPT | Performed by: FAMILY MEDICINE

## 2024-03-22 PROCEDURE — 1036F TOBACCO NON-USER: CPT | Performed by: FAMILY MEDICINE

## 2024-03-22 RX ORDER — KETOCONAZOLE 20 MG/G
CREAM TOPICAL 2 TIMES DAILY
Qty: 60 G | Refills: 3 | Status: SHIPPED | OUTPATIENT
Start: 2024-03-22 | End: 2025-03-22

## 2024-03-22 RX ORDER — LIFITEGRAST 50 MG/ML
1 SOLUTION/ DROPS OPHTHALMIC DAILY
Qty: 60 EACH | Refills: 11 | Status: SHIPPED | OUTPATIENT
Start: 2024-03-22 | End: 2024-04-22 | Stop reason: SDUPTHER

## 2024-03-22 RX ORDER — DULOXETIN HYDROCHLORIDE 20 MG/1
20 CAPSULE, DELAYED RELEASE ORAL DAILY
Qty: 90 CAPSULE | Refills: 1 | Status: SHIPPED | OUTPATIENT
Start: 2024-03-22 | End: 2024-09-18

## 2024-03-22 RX ORDER — DICLOFENAC SODIUM 10 MG/G
GEL TOPICAL
Qty: 100 G | Refills: 2 | Status: SHIPPED | OUTPATIENT
Start: 2024-03-22

## 2024-03-22 ASSESSMENT — PAIN SCALES - GENERAL: PAINLEVEL: 0-NO PAIN

## 2024-03-22 ASSESSMENT — PATIENT HEALTH QUESTIONNAIRE - PHQ9
2. FEELING DOWN, DEPRESSED OR HOPELESS: NOT AT ALL
SUM OF ALL RESPONSES TO PHQ9 QUESTIONS 1 AND 2: 0
1. LITTLE INTEREST OR PLEASURE IN DOING THINGS: NOT AT ALL

## 2024-03-22 ASSESSMENT — ENCOUNTER SYMPTOMS
CONFUSION: 0
BLOOD IN STOOL: 0
MYALGIAS: 1
DECREASED CONCENTRATION: 0
WEAKNESS: 0
JOINT SWELLING: 0
NUMBNESS: 0
HEMATURIA: 0
SORE THROAT: 0
PALPITATIONS: 0
FREQUENCY: 0
HEADACHES: 0
VOMITING: 0
FEVER: 0
FATIGUE: 0
DIZZINESS: 0
DYSURIA: 0
COUGH: 0
DIARRHEA: 0
UNEXPECTED WEIGHT CHANGE: 0
EYE PAIN: 0
TROUBLE SWALLOWING: 0
SHORTNESS OF BREATH: 0
HALLUCINATIONS: 0
ABDOMINAL PAIN: 0
NAUSEA: 0
ARTHRALGIAS: 1

## 2024-03-22 NOTE — PROGRESS NOTES
"Subjective   Patient ID: Parvin Garibay \"Sergo" is a 58 y.o. female.    Patient comes in for few issues.  She wanted to discuss the different NSAIDs and which is best for her.  Celebrex is working.  She avoids Aleve.  She will continue Celebrex.  She has been slowly tapering off Cymbalta over the last few months.  It has been a little bit of a struggle but she is down to 20 mg daily.  She is having shoulder surgery in the summer and will most likely stop it then.  She has a very high LDL but has a severe intolerance to statins and has declined Repatha.  She has no chest pain or shortness of breath.  She is morbidly obese with a BMI of 47.47.  Glycohemoglobin is in the normal range.  She has dry eyes.  She has keratoconjunctivitis and needs refill of medications.  She does not drink or smoke.        Review of Systems   Constitutional:  Negative for fatigue, fever and unexpected weight change.   HENT:  Negative for congestion, ear pain, hearing loss, sore throat and trouble swallowing.    Eyes:  Negative for pain and visual disturbance.   Respiratory:  Negative for cough and shortness of breath.    Cardiovascular:  Negative for chest pain, palpitations and leg swelling.   Gastrointestinal:  Negative for abdominal pain, blood in stool, diarrhea, nausea and vomiting.   Genitourinary:  Negative for dysuria, frequency, hematuria and urgency.   Musculoskeletal:  Positive for arthralgias and myalgias. Negative for joint swelling.   Skin:  Negative for pallor and rash.   Neurological:  Negative for dizziness, syncope, weakness, numbness and headaches.   Psychiatric/Behavioral:  Negative for confusion, decreased concentration, hallucinations and suicidal ideas.      Vitals:    03/22/24 1054   BP: 130/80   Pulse: 85   Temp: 36.4 °C (97.6 °F)   SpO2: 95%      Objective   Physical Exam  Constitutional:       Appearance: Normal appearance. She is obese.   Eyes:      Extraocular Movements: Extraocular movements intact.      " Conjunctiva/sclera: Conjunctivae normal.      Pupils: Pupils are equal, round, and reactive to light.   Cardiovascular:      Rate and Rhythm: Normal rate and regular rhythm.      Heart sounds: Normal heart sounds.   Pulmonary:      Effort: Pulmonary effort is normal.      Breath sounds: Normal breath sounds.   Musculoskeletal:      Cervical back: Neck supple.   Skin:     General: Skin is warm and dry.   Neurological:      General: No focal deficit present.      Mental Status: She is alert.   Psychiatric:         Mood and Affect: Mood normal.         Speech: Speech normal.         Behavior: Behavior normal.         Cognition and Memory: Cognition normal.         Assessment/Plan   Diagnoses and all orders for this visit:  Mixed hyperlipidemia  Pain of lower extremity, unspecified laterality  -     diclofenac sodium (Voltaren) 1 % gel; APPLY A SMALL AMOUNT TO THE PAINFUL AREA 3 TIMES A DAY AS NEEDED  -     DULoxetine (Cymbalta) 20 mg DR capsule; Take 1 capsule (20 mg) by mouth once daily. Do not crush or chew.  Acquired hypothyroidism  -     TSH; Future  Candidiasis  -     ketoconazole (NIZOral) 2 % cream; Apply topically 2 times a day.  Dry eyes  -     lifitegrast (Xiidra) 5 % dropperette; Administer 1 drop into affected eye(s) once daily.  Keratoconjunctivitis sicca (CMS/HCC)

## 2024-03-25 DIAGNOSIS — Z12.11 COLON CANCER SCREENING: ICD-10-CM

## 2024-03-29 ENCOUNTER — APPOINTMENT (OUTPATIENT)
Dept: GASTROENTEROLOGY | Facility: HOSPITAL | Age: 59
End: 2024-03-29
Payer: COMMERCIAL

## 2024-04-04 ENCOUNTER — OFFICE VISIT (OUTPATIENT)
Dept: SLEEP MEDICINE | Facility: CLINIC | Age: 59
End: 2024-04-04
Payer: COMMERCIAL

## 2024-04-04 VITALS
OXYGEN SATURATION: 98 % | WEIGHT: 269.6 LBS | TEMPERATURE: 97.5 F | BODY MASS INDEX: 47.77 KG/M2 | HEART RATE: 79 BPM | HEIGHT: 63 IN

## 2024-04-04 DIAGNOSIS — Z12.11 COLON CANCER SCREENING: Primary | ICD-10-CM

## 2024-04-04 DIAGNOSIS — F51.04 CHRONIC INSOMNIA: ICD-10-CM

## 2024-04-04 DIAGNOSIS — E66.01 MORBID OBESITY (MULTI): ICD-10-CM

## 2024-04-04 DIAGNOSIS — G47.33 OSA (OBSTRUCTIVE SLEEP APNEA): Primary | ICD-10-CM

## 2024-04-04 PROCEDURE — 99214 OFFICE O/P EST MOD 30 MIN: CPT | Performed by: INTERNAL MEDICINE

## 2024-04-04 PROCEDURE — 1036F TOBACCO NON-USER: CPT | Performed by: INTERNAL MEDICINE

## 2024-04-04 RX ORDER — POLYETHYLENE GLYCOL 3350, SODIUM CHLORIDE, SODIUM BICARBONATE AND POTASSIUM CHLORIDE 420; 5.72; 11.2; 1.48 G/4L; G/4L; G/4L; G/4L
4000 POWDER, FOR SOLUTION ORAL ONCE
Qty: 4000 ML | Refills: 0 | Status: SHIPPED | OUTPATIENT
Start: 2024-04-04 | End: 2024-04-04 | Stop reason: WASHOUT

## 2024-04-04 NOTE — PATIENT INSTRUCTIONS
[facility] Sleep Medicine  [facility]   [facility]  [address]   [facility] [id number]    [facility]   [facility]  [address]   [facility] [address]   [facility]  [address]   [address]  [facility] [address]           NAME: Parvin Garibay   DATE: 4/4/2024     Your Sleep Provider Today: Nabeel Edwards MD PhD  Your Primary Care Physician: Mayela Gupta MD   Your Referring Provider: No ref. provider found    Thank you for coming to the Sleep Medicine Clinic today! Your sleep medicine provider today was: Nabeel Edwards MD PhD Below is a summary of your treatment plan, other important information, and our contact numbers:  If you need to schedule an appointment, please call [phone]  If you need general assistance (e.g. forms completed, general questions), please call my , Sole, at [phone].  If you have a medical question about your sleep issues, please contact our nurses, Lissett or Krystle at [phone].   You can also contact us through [employer].      DIAGNOSIS:   1. WHITNEY (obstructive sleep apnea)                TREATMENT PLAN     Plan to start CPAP for your sleep apnea.       Follow-up Appointment:   Followup with me in 3 months.      IMPORTANT INFORMATION     Call [phone] for medical emergencies.  Our offices are generally open from Monday-Friday, 9 am - 5 pm.  If you need to get in touch with me, you may either call me and my team(number is below) or you can use [employer].  If a referral for a test, for CPAP, or for another specialist was made, and you have not heard about scheduling this within a week, please call scheduling at [phone]).  If you are unable to make your appointment for clinic or an overnight study, kindly call the office at least 48 hours in advance to cancel and reschedule.  If you are on CPAP, please bring your device's card or the device to each clinic  appointment.   There are no supporting services by either the sleep doctors or their staff on weekends and Holidays, or after 5 PM on weekdays.   If you have been asked to come to a sleep study, make sure you bring toiletries, a comfy pillow, and any nighttime medications that you may regularly take. Also be sure to eat dinner before you arrive. We generally do not provide meals.      PRESCRIPTIONS     We require 7 days advanced notice for prescription refills. If we do not receive the request in this time, we cannot guarantee that your medication will be refilled in time.      IMPORTANT PHONE NUMBERS      scheduling for medical testin952 [phone]   Sleep Medicine Clinic Fax: [phone]  Appointments (for Pediatric Sleep Clinic): [phone] - option 1  Appointments (for Adult Sleep Clinic): [phone] - option 2  Appointments (For Sleep Studies): [phone] - option 3  Behavioral Sleep Medicine: [phone]  Bariatric Surgery: [phone] ( Bariatric Surgery Website)   Sleep Surgery: [phone]  ENT (Otolaryngology): [phone]  Headache Clinic (Neurology): [phone]  Neurology: [phone]  Psychiatry: [phone]  Pulmonary Function Testing (PFT) Center: [phone]  Pulmonary Medicine: [phone]  [employer] (DME): [phone]  [employer] (DME): [phone]  [facility] (DME): [phone]      COMMON PROVIDERS WE REFER TO     For Weight Loss - Dr. Gladys Blevins - Call [phone]  For Sleep Surgery - Dr. Ravinder Flaherty - Call [phone]      OUR ADULT SLEEP MEDICINE TEAM   Please do not hesitate to call the office or sleep nurse with any questions between appointments:    Adult Sleep Nurses (Krystle Lam, RN and Lissett Putnam RN):  For clinical questions and refilling prescriptions: [phone]  Email sleep diaries and other documents at: [email or web address]    Adult Sleep  Medicine Secretaries:  Anna Abreu (For Kole/Lr/Krise/Strohl/Yeh/Lane):   P: [phone]  F: [phone]  Sole Good (For Edwards/Geovani): P: [phone]  Fax: [phone]  Rebeca Robb (For Jurcevic/Blank): P: [phone]  F: [phone]  Gabbi Lazo (For Cami): P: [phone]  F: [phone]  Lcuy Alfredo (For Elsa/Юлия/Zakhary): P: [phone]  F: [phone]  Deann Tate (For Hiram/Russ): P: [phone]  F: [phone]     Adult Sleep Medicine Advanced Practice Providers:  Raleigh Anthony ([place], [place])  Laurita Redman ([facility])  Shikha Olivo CNP ([place], [place], [place])  Greer Middleton CNP ([place], [place], [place])  Leticia Almanza ([place], [place], [place])  Festus Solano CNP ([facility], [place])        OUR SLEEP TESTING LOCATIONS     Our team will contact you to schedule your sleep study, however, you can contact us as follow:  Main Phone Line (scheduling only): [phone]), option 3  Adult and Pediatric Locations  [facility] (6 years and older): [facility] - 4th floor ([address] [place] [address]) After hours line: [phone]  [facility] (Main campus: All ages): [facility], 6th floor. After hours line: [phone]   [place] (5 years and older; younger considered on case-by-case basis): [address]; [address], [address]. Scheduling  After hours line: [phone]   [facility] (6 years and older): [address]; [address]; [address]   [place] (6 years and older): [address] [facility], [address]  After hours line: [phone]   [place] (13 years and older) in [place]: [address], 2nd floor  After hours line: [phone]   [place] (13 year and older): [address] [facility] [address]  After hours line: [phone]     Adult Only Locations:   [place] (18 years and older): [address] [place] [address]  "floor   [place] (18 years and older): [address] [facility]; 4th floor  After hours line: [phone]   [place] (18 years and older) at [place]: [address] [facility]  After hours line: [phone]          CONTACTING YOUR SLEEP MEDICINE PROVIDER     Send a message directly to your provider through \"My Chart\", which is the email service through your  Records Account: [email or web address]   Call [phone] and leave a message. One of the administrative assistants will forward the message to your sleep medicine provider through \"My Chart\" and/or email.     Your sleep medicine provider for this visit was: Nabeel Edwards MD PhD        "

## 2024-04-04 NOTE — PROGRESS NOTES
"   Patient: Parvin Garibay    [id number]  : 1965 -- AGE 58 y.o.    Provider: Nabeel Edwards MD PhD     [facility]  [facility]   Service Date: 2024              [facility] Sleep Medicine Clinic  New Visit Note      HISTORY OF PRESENT ILLNESS     The patient's referring provider is: Mayela Gupta MD     REASON FOR VISIT:   Chief Complaint   Patient presents with    Insomnia        HISTORY OF PRESENT ILLNESS   Ms. Parvin Garibay \"Serog" is a 58 y.o. female with past medical history of Sjogren's, gout, HLD, HTN, hypothyroid, fibromyalgia who presents to a [facility] Sleep Medicine Clinic for a sleep medicine evaluation with concerns of sleep apnea.     PAST SLEEP HISTORY  Patient with history of snoring and choking/gasping episodes. She had a sleep study in  but we do not have results, but there is some mention in clinic notes. There may have been a PSG on 14 that showed moderate WHITNEY with an AHI of 26.6/h. A CPAP study was done on 5/20/15 but patient only seemed to use CPAP for a brief period and study was terminated.    She takes trazodone 25mg to help with sleep.    She had a diagnostic sleep study in 2/10/2024 and a BMI of 47.6.  She slept for 295 minutes with a sleep efficiency of 74%.  The RDI 3% was 27 events/hour and an RDI 4% was 15 events/hour.  The time below oxygen saturation of 88% was 5.5 minutes.  The SpO2 marco antonio was 84%.  PLM's at a rate of 19.8 events/hour are noted, PAP therapy at 5-15 cm of water was recommended.    CURRENT HISTORY  On today's visit, she comes to review her sleep study results. In the interim she has used the breathe-right strip which has helped her breathing. Otherwise her sleep remains the same.    Sleep schedule:  In bed: 9-10pm  Activities in bed:   Bedtime Activities: watch TV; sometimes read  Subjective sleep latency:  30-60 min  Awakenings during night: 1-2x for BR  Length of awakenings: < 15 min unless after 4AM  Final " awakening time: 6AM (sometimes 3-4AM) - wakes on own  Out of bed: will get up when wakes up  Overall estimate of total sleep time: 6-7h    Weekends: similar  Preferred sleeping position: prone  Typically sleeps  with her wife .       Daytime Symptoms:  On awakening patient reports: feels sleepy, morning dry mouth, and hard to get out of bed    Daytime: During the day, lack of sleep makes her more prone to errors. She is an ..    Driving History: she has a 30m commute from home to work. The patient typically drives to work.  She is not a . With driving, the patient denies sleepy driving, with 0 sleepiness-related motor vehicle accidents and 0 near misses.      ESS: 3  MARTY: 13  FOSQ:  27      REVIEW OF SYSTEMS     REVIEW OF SYSTEMS  Review of Systems   All other systems reviewed and are negative.      ALLERGIES AND MEDICATIONS     ALLERGIES  Allergies   Allergen Reactions    Rosuvastatin Other    Simvastatin Other    Statins-Hmg-Coa Reductase Inhibitors Other    Penicillins Rash    Sulfa (Sulfonamide Antibiotics) Rash    Sulfadiazine Rash       MEDICATIONS  Current Outpatient Medications   Medication Sig Dispense Refill    allopurinol (Zyloprim) 300 mg tablet TAKE 1 TABLET BY MOUTH EVERY DAY 90 tablet 3    celecoxib (CeleBREX) 200 mg capsule TAKE 1 CAPSULE (200 MG) BY MOUTH ONCE DAILY 90 capsule 1    diclofenac sodium (Voltaren) 1 % gel APPLY A SMALL AMOUNT TO THE PAINFUL AREA 3 TIMES A DAY AS NEEDED 100 g 2    DULoxetine (Cymbalta) 20 mg DR capsule Take 1 capsule (20 mg) by mouth once daily. Do not crush or chew. 90 capsule 1    ketoconazole (NIZOral) 2 % cream Apply topically 2 times a day. 60 g 3    levothyroxine (Synthroid, Levoxyl) 125 mcg tablet Take 1 tablet (125 mcg) by mouth once daily.      lifitegrast (Xiidra) 5 % dropperette Administer 1 drop into affected eye(s) once daily. 60 each 11    liothyronine (Cytomel) 5 mcg tablet Take 1 tablet (5 mcg) by mouth 3 times a day. 270  tablet 1    traZODone (Desyrel) 50 mg tablet TAKE 1/2 TABLET BY MOUTH AT BEDTIME 45 tablet 3    lisinopril 10 mg tablet Take by mouth once daily.      polyethylene glycol-electrolytes (Nulytely Lemon-Lime) 420 gram solution Take 4,000 mL by mouth 1 time for 1 dose. (Patient not taking: Reported on 4/4/2024) 4000 mL 0     No current facility-administered medications for this visit.         PAST HISTORY     PAST MEDICAL HISTORY  She  has a past medical history of Acute sinusitis (01/15/2024), Adenomatous polyp of colon (07/12/2023), Ankle pain (01/15/2024), Autoimmune thyroiditis (08/11/2014), Bilateral presbyopia (07/12/2023), Casts urinary hyaline (07/12/2023), Dysthymic disorder (08/06/2014), Encounter for other screening for malignant neoplasm of breast (10/22/2020), History of metabolic disorder (01/15/2024), Nontraumatic complete tear of right rotator cuff (01/15/2024), Nontraumatic tear of rotator cuff (01/15/2024), Other conditions influencing health status (06/18/2015), Personal history of other diseases of the respiratory system, Personal history of other endocrine, nutritional and metabolic disease (06/18/2015), Personal history of other endocrine, nutritional and metabolic disease, Personal history of other endocrine, nutritional and metabolic disease, Right knee pain (07/12/2023), and Tendinitis of right rotator cuff (07/12/2023).      PAST SURGICAL HISTORY:  Past Surgical History:   Procedure Laterality Date    OTHER SURGICAL HISTORY  04/05/2019    No history of surgery       FAMILY HISTORY  No family history on file.  She does not have a family history of sleep disorder (e.g., sleep apnea, narcolepsy in any first degree relatives). Father was a loud snorer but never tested      SOCIAL HISTORY  She  reports that she has quit smoking. Her smoking use included cigarettes. She has never used smokeless tobacco. No history on file for alcohol use and drug use. She currently lives with a partner. She works as  "an .    Caffeine consumption: Yesm- 2/day  Alcohol consumption: No  Smoking: No - quit 2013  Marijuana: No      PHYSICAL EXAM     Physical Examination: Pulse 79   Temp 36.4 °C (97.5 °F)   Ht 1.6 m (5' 3\")   Wt 122 kg (269 lb 9.6 oz)   SpO2 98% Comment: Room Air  BMI 47.76 kg/m²     PREVIOUS WEIGHTS:  Wt Readings from Last 3 Encounters:   04/04/24 122 kg (269 lb 9.6 oz)   03/22/24 122 kg (268 lb)   02/09/24 122 kg (268 lb 11.9 oz)       General: The patient is a pleasant female, in no acute distress. HEENT: She has a  a modified Mallampati grade 3 airway with Numbers; 0-4 (with +): 1+ tonsils bilaterally. The soft palate was normal and the uvula was normal. The A/P diameter of the velopharynx was narrowed. The oropharynx was not shallow in the A/P diameter. Lateral wall narrowing was present. Tongue ridging was notpresent. Erythema of the posterior pharynx was not present. Mucosal hypertrophy in the posterior oropharynx was not present. Retrognathia was not and micrognathia was not present. Neck: The neck was not enlarged. No JVP, bruits or lymphadenopathy was appreciated. Chest: Clear to auscultation. No wheezes, rales, or rhonchi. Cardiovascular: Regular rate and rhythm. No murmurs, gallops, or clicks. Abdomen: Soft, nontender, nondistended. Positive bowel sounds. Extremities: No clubbing, cyanosis, or edema is noted. Neurologic exam: Alert, oriented x3 and was grossly non-focal.    RESULTS/DATA     No results found for: \"IRON\", \"TRANSFERRIN\", \"IRONSAT\", \"TIBC\", \"FERRITIN\"    Bicarbonate (mmol/L)   Date Value   09/23/2023 29   06/20/2022 30   01/04/2022 31       DIAGNOSES     Problem List and Orders  There are no diagnoses linked to this encounter.        ASSESSMENT/PLAN     Ms. Garibay is a 58 y.o. female and with past medical history of Sjogren's, gout, HLD, HTN, hypothyroid, fibromyalgia She presents to  the [facility] Sleep Medicine Clinic to address her tiredness.      Obstructive " sleep apnea (WHITNEY): The patient has moderate WHITNEY which likely explains her symptoms of poor sleep quality, sleepiness, insomnia. Various options for managing sleep apnea were discussed with the patient including (a) weight loss; (b) oral appliance / mandibular advancement devices; (c) upper airway surgery; and (d) treatment with continuous positive pressure (CPAP) therapy. Given the severity of the sleep apnea from the polysomnogram, PAP treatment has been recommended as first line therapy. We discussed the trade-offs between in-lab CPAP titration vs. Home APAP. I think she is a reasonable candidate for APAP titration. Thus, I will order an APAP device which she can get from a DME company. We will script for settings of 5-15 cm H2O. she was informed of potential difficulties with CPAP including nasal congestion, rhinorrhea, mask discomfort, and pressure sores. In addition, counseling regarding weight loss and avoidance of alcohol prior to bedtime was provided.  At the next visit, any issues or problems that may arise with APAP use will be addressed. If there are any concerns of efficacy of APAP, we may then need to pursue an in-lab sleep study.    Chronic insomnia.  She has had issues with difficulties falling and staying asleep.  She has been using trazodone which helps her grogginess in the morning.  Fall asleep but has some residual she has cut back down to 25 mg.  If she does not have significant sleep apnea then we might consider whether she should be referred for cognitive behavioral therapy or consideration for another sleep aid.    Obesity.  The patient was counseled that her weight is the strongest modifiable risk factor and contributor for WHITNEY. She was counseled to consider weight loss options to include changes in dietary habits and activity. We briefly discussed the use of calorie tracking smartphone apps and the use of activity meters to provide feedback that helps in losing weight.  Before initiating an  exercise plan, she should carefully review the approach with her primary care provider.       Follow up: 1-3 months after starting CPAP         Nabeel Edwards MD PhD

## 2024-04-05 ENCOUNTER — APPOINTMENT (OUTPATIENT)
Dept: ORTHOPEDIC SURGERY | Facility: CLINIC | Age: 59
End: 2024-04-05
Payer: COMMERCIAL

## 2024-04-12 ENCOUNTER — OFFICE VISIT (OUTPATIENT)
Dept: ORTHOPEDIC SURGERY | Facility: CLINIC | Age: 59
End: 2024-04-12
Payer: COMMERCIAL

## 2024-04-12 DIAGNOSIS — M75.101 TEAR OF RIGHT ROTATOR CUFF, UNSPECIFIED TEAR EXTENT, UNSPECIFIED WHETHER TRAUMATIC: Primary | ICD-10-CM

## 2024-04-12 PROCEDURE — 99214 OFFICE O/P EST MOD 30 MIN: CPT | Performed by: STUDENT IN AN ORGANIZED HEALTH CARE EDUCATION/TRAINING PROGRAM

## 2024-04-12 PROCEDURE — L3670 SO ACRO/CLAV CAN WEB PRE OTS: HCPCS | Performed by: STUDENT IN AN ORGANIZED HEALTH CARE EDUCATION/TRAINING PROGRAM

## 2024-04-12 NOTE — PROGRESS NOTES
Parvin Garibay is a 58 y.o. year-old  female  she comes in today to review the repeat MRI and discuss surgery.  Again she has a known rotator cuff tear that is failed conservative treatment including physical therapy corticosteroid injections.  She is persistently symptomatic and would like to consider repair at this point.      Past Medical, Family, and Social History reviewed     Review of Systems  A complete review of systems was conducted, pertinent only to the HPI noted above.    Physical Exam  GEN: Alert and Oriented x 3  Constitutional: Well appearing, in no apparent distress.  Eyes: sclera anicteric  ENT: hearing appropriate for normal conversation, neck appears symmetric with no gross thyromegaly  Pulm: No labored breathing, no wheezing  CVS: Regular rate and rhythm  PSY: normal mood and affect  Skin: No rashes, erythema, or induration around shoulder     Focused Musculoskeletal Exam:     Side: Right shoulder:  PROM:   FE (170)   ER 60 ABER/ABIR: 90/90  AROM:   FE (170)   ER 45 IR T8  Strength:  Supra [4/5] Infra [5/5] Subscap [5/5]  Abd [5/5]    Special Tests  Shoulder  Pain with Neer and Rausch and empty can      ACJ:  AC TTP: [neg]  Cross Arm [neg]  AC prominence [no]      [Sensation intact Ax/median/ulnar/radial distributions  Motor intact Ax/median/radial/ulnar/AIN/PIN    X-rays and MRI of the shoulder independently viewed and interpreted: No significant degenerative changes previous MRI was reviewed which demonstrate roughly a 1 cm anterior rotator cuff tendon tear in the setting of some tendinosis this MRI was December 2022    MRI from February 2024 independently reviewed and interpreted there is a full-thickness rotator cuff tear roughly 2 cm anterior to posterior with no retraction and no atrophy has progressed since last MRI.  Degenerative labrum tearing, likely biceps rupture.    Patient was prescribed a [abduction sling for [rotator cuff tear. The patient has weakness, instability and/or  deformity of their [Right shoulder which requires stabilization from this orthosis to improve their function.  Verbal and written instructions for the use, wear schedule, cleaning and application of this item were given.  Patient was instructed that should the brace result in increased pain, decreased sensation, increased swelling, or an overall worsening of their medical condition, to please contact our office immediately. Orthotic management and training was provided for skin care, modifications due to healing tissues, edema changes, interruption in skin integrity, and safety precautions with the orthosis.    The patient history, physical examination and imaging studies are consistent with the aforementioned assessment. The treatment options including medical management and surgery including rotator cuff repair, possible bioconductive bovine collagen implant and possible biceps tenodesis were discussed at length. I discussed at length the importance of post-operative rehab and the risk for adhesive capsulitis if this therapy is not appropriately attended. The risks and benefits of the surgery including but not limited to bleeding, infection, nerve injury, and potential future surgery were also presented and reviewed. I discussed at length the fact that this surgery is designed to address pain and that recovery of strength is not predictable. All questions were answered. The patient acknowledged the explanation, agreed to proceed with the plan.  She will plan for surgery sometime in the summer.

## 2024-04-14 DIAGNOSIS — E03.9 HYPOTHYROIDISM, UNSPECIFIED: ICD-10-CM

## 2024-04-15 RX ORDER — LEVOTHYROXINE SODIUM 125 UG/1
125 TABLET ORAL DAILY
Qty: 90 TABLET | Refills: 3 | Status: SHIPPED | OUTPATIENT
Start: 2024-04-15

## 2024-04-16 PROBLEM — M75.101 UNSPECIFIED ROTATOR CUFF TEAR OR RUPTURE OF RIGHT SHOULDER, NOT SPECIFIED AS TRAUMATIC: Status: ACTIVE | Noted: 2024-04-15

## 2024-04-18 ENCOUNTER — TELEPHONE (OUTPATIENT)
Dept: SURGERY | Facility: CLINIC | Age: 59
End: 2024-04-18
Payer: COMMERCIAL

## 2024-04-18 PROBLEM — H04.123 DRY EYES: Status: ACTIVE | Noted: 2024-04-18

## 2024-04-18 PROBLEM — B37.9 CANDIDIASIS: Status: ACTIVE | Noted: 2024-04-18

## 2024-04-18 NOTE — TELEPHONE ENCOUNTER
Spoke with patient about no ins coverage for bariatrics, went over an estimate of self pay costs. Stated she will think about it, and will follow up if she wishes to proceed

## 2024-04-22 ENCOUNTER — PROCEDURE VISIT (OUTPATIENT)
Dept: PRIMARY CARE | Facility: CLINIC | Age: 59
End: 2024-04-22
Payer: COMMERCIAL

## 2024-04-22 VITALS
WEIGHT: 265 LBS | DIASTOLIC BLOOD PRESSURE: 70 MMHG | OXYGEN SATURATION: 98 % | HEART RATE: 68 BPM | BODY MASS INDEX: 46.95 KG/M2 | TEMPERATURE: 97.6 F | SYSTOLIC BLOOD PRESSURE: 100 MMHG | HEIGHT: 63 IN

## 2024-04-22 DIAGNOSIS — D48.5 NEOPLASM OF UNCERTAIN BEHAVIOR OF SKIN: Primary | ICD-10-CM

## 2024-04-22 DIAGNOSIS — H04.123 DRY EYES: ICD-10-CM

## 2024-04-22 PROCEDURE — 88305 TISSUE EXAM BY PATHOLOGIST: CPT | Performed by: PATHOLOGY

## 2024-04-22 PROCEDURE — 88305 TISSUE EXAM BY PATHOLOGIST: CPT

## 2024-04-22 PROCEDURE — 0753T DGTZ GLS MCRSCP SLD LEVEL IV: CPT

## 2024-04-22 PROCEDURE — 11301 SHAVE SKIN LESION 0.6-1.0 CM: CPT | Performed by: FAMILY MEDICINE

## 2024-04-22 RX ORDER — LIFITEGRAST 50 MG/ML
1 SOLUTION/ DROPS OPHTHALMIC DAILY
Qty: 60 EACH | Refills: 11 | Status: SHIPPED | OUTPATIENT
Start: 2024-04-22

## 2024-04-22 ASSESSMENT — PAIN SCALES - GENERAL: PAINLEVEL: 0-NO PAIN

## 2024-04-23 PROBLEM — D48.5 NEOPLASM OF UNCERTAIN BEHAVIOR OF SKIN: Status: ACTIVE | Noted: 2024-04-23

## 2024-04-23 NOTE — PROGRESS NOTES
"Patient ID: Parvin aGribay \"Sergo" is a 58 y.o. female.    Shaving Epidermal/Dermal    Date/Time: 4/23/2024 12:34 PM    Performed by: Mayela Gupta MD  Authorized by: Mayela Gupta MD    Number of Lesions: 1  Lesion 1:     Body area: upper extremity (Right upper extremity)    Upper extremity location: R lower arm    Initial size (mm): 4    Final defect size (mm): 8    Malignancy: malignancy unknown      Destruction method comment: Derma blade    Comments:  Lesion removed after cleaned and injected with lidocaine with epinephrine.  Cauterized with silver nitrate.    "

## 2024-04-23 NOTE — PATIENT INSTRUCTIONS
Will send lesion to lab for pathology.  Keep dry for the next day and then soap and water only.  If it bleeds apply pressure.  If uncontrolled follow-up.  Will notify results when available.

## 2024-05-01 LAB
LABORATORY COMMENT REPORT: NORMAL
PATH REPORT.FINAL DX SPEC: NORMAL
PATH REPORT.GROSS SPEC: NORMAL
PATH REPORT.RELEVANT HX SPEC: NORMAL
PATH REPORT.TOTAL CANCER: NORMAL

## 2024-06-10 NOTE — PROGRESS NOTES
[facility]  [facility]  [address]  [facility] [id number] [phone]     [facility]  Follow Up Visit Note         HISTORY OF PRESENT ILLNESS      The patient's referring provider is: Mayela Gupta MD      REASON FOR VISIT: No chief complaint on file.        HISTORY OF PRESENT ILLNESS   Ms. Parvin Garibay is a 58 y.o. female with past medical history of Sjogren's, gout, HLD, HTN, hypothyroid, fibromyalgia who presents to a [facility] for a sleep medicine evaluation with concerns of sleep apnea.     6/17/24: UPDATED: The patient returned to the clinic to review her initial pap compliance and renew annual supplies. Her over 4 hours pap compliance rate was  97 %, and her ESS  is 2., and MARTY: 9  today. She had seen Dr. Edwards previously and will follow up with Dr. Edwards in August. She reports having no issues with her pap and significantly improving after using CPAP. She denies snoring and having morning headaches, does not wake up at night, and falls asleep for only 30 minutes.    4/4/2024: she comes to review her sleep study results. In the interim she has used the breathe-right strip which has helped her breathing. Otherwise her sleep remains the same. ESS: 3  MARTY: 13, FOSQ:  27 (Nabeel Edwards MD PhD)     1/3/24: The patient reports a prior dx of WHITNEY. She feels her CPAP could be. Her wife has WHITNEY and is using CPAP.  She currently snores. It will get worse when cogested. Witnessed apneas have not been seen. Some choking/gasping episodes as well. Over the past five years, the patient's weight has  not significantly changed.She does take trazodone at 25mg to help her sleep. Helps with sleep onset. She has AM symptoms of grogginess with it. (Nabeel Edwards MD PhD)      PAST SLEEP HISTORY  8/20/14 :PSG : AHI:  26.6/h. A CPAP study was done, but patient only seemed  to use CPAP for a brief period and study was terminated.  5/20/15 : have no results  2/10/2024: PSG:  BMI: 47.6.  She slept for 295 minutes with a sleep efficiency of 74%.  RDI 3% :27 /hour,  RDI 4% : 15 /hour.  < 88% :5.5 minutes. SpO2 marco antonio :84%.  PLM :19.8 /hour, consider 5-15 cwp      Sleep schedule: (after cpap)  In bed: 9-10pm  Activities in bed:   Bedtime Activities: watch TV; sometimes read  Subjective sleep latency:  30 min  Awakenings during night: 0 times  Final awakening time: 6 AM  wakes on own  Out of bed: 6 AM  Overall estimate of total sleep time: 8 hours  Weekends: similar  Preferred sleeping position: prone  Typically sleeps  with her wife .       SLEEP ROS:  Night symptoms: POSITIVE for snoring before PAP but not now and witnessed apnea before PAP but now   Morning symptoms: POSITIVE for unrefreshing sleep before PAP but not now and morning headache before PAP but not now  Daytime symptoms POSITIVE for excessive daytime sleepiness before PAP but not now and fatigue before PAP but not now  Hypersomnia / narcolepsy symptoms: Patient denies symptoms of a hypersomnolence disorder such as sleep paralysis, sleep-related hallucinations, recurrent sleep attacks, automatic behaviors, and cataplexy.   RLS symptoms: Patient denies RLS symptoms.  Movements in sleep: Patient denies problematic movements in sleep such as seizures during sleep, frequent leg kicks / jerks while asleep, and sleep-related bruxism.  Parasomnia symptoms: Patient denies symptoms of parasomnia such as sleepwalking, sleeptalking, sleep-eating, acting out dreams, and nightmares.     REVIEW OF SYSTEMS: All other systems have been reviewed and are negative.    PERTINENT SOCIAL HISTORY:  Occupation:  assistant  Smoking: No   ETOH: No   Marijuana: No   Caffeine: Yes,  2 cups of coffee in the  morning  Sleep aids: No   Claustrophobia: No     PERTINENT PAST SURGICAL HISTORY:  non-contributory    PERTINENT FAMILY HISTORY:  loud  "snoring- father  insomnia- father    Active Problems, Allergy List, Medication List, and PMH/PSH/FH/Social Hx have been reviewed and reconciled in chart. No significant changes unless documented in the pertinent chart section. Updates made when necessary.     Objective     Vitals:    06/17/24 1505   BP: 124/85   Pulse: 70   Temp: 36.4 °C (97.6 °F)   SpO2: 97%        Physical Exam  Constitutional: Awake, not in distress  Lungs: Clear to auscultation bilateral, no rales  Heart: Regular rate and rhythm, no murmurs  Skin: Warm, no rash  Neuro: No tremors, moves all extremities  Psych: alert and oriented to time, place, and person    Assessment/Plan   Pavrin Garibay \"Diana\" is a 59 y.o. female presents today in [facility] with the following problems:    PAP Adherence:  DURABLE MEDICAL EQUIPMENT COMPANY: [employer]  Machine: THERAPY: RESMED AIRSENSE 11 PRESSURE SETTINGS: 5 - 15 cm H2O  Mask: Dreamwear nasal medium  Issues with therapy: ISSUES WITH THERAPY: denies  Benefits with PAP: PERCEIVED BENEFITS OF PAP: refreshing sleep reduced daytime sleepiness decreased or no snoring decreased nocturnal awakenings decreased episodes of nocturia better sleep quality decreased morning headaches    A PAP adherence download was obtained and data was reviewed personally today in clinic. (see scanned document in EPIC)               ASSESSMENT/PLAN      Ms. Garibay is a 58 y.o. female and with past medical history of Sjogren's, gout, HLD, HTN, hypothyroid, fibromyalgia She presents to  the [facility] to address her tiredness.     Obstructive sleep apnea  -Great compliance, Continue 5-15 cmH20 auto CPAP, and renew annual supplies through [employer].  -Sleep apnea and PAP therapy education were provided at length in the clinic today. Parvin Garibay \"Diana\" verbalized understanding.  -Emphasized diet, exercise, and weight loss in the clinic, as were " "non-supine sleep, avoiding alcohol in the late evening, and driving or operating heavy machinery when sleepy.  -Parvin Garibay \"Diana\"verbalizes understanding of the above instructions and risks.     Chronic insomnia.    -Report better, no problem to wake up during the night.She has had issues with difficulties falling and staying asleep.    -She has been using 25 mg trazodone which helps her grogginess in the morning.      Morbid Obesity  -with a BMI of 47.47.   -Encourage to have regular exercise to manage weight well.       Follow with with Dr. Edwards in Aug  "

## 2024-06-11 ENCOUNTER — APPOINTMENT (OUTPATIENT)
Dept: PRIMARY CARE | Facility: CLINIC | Age: 59
End: 2024-06-11
Payer: COMMERCIAL

## 2024-06-17 ENCOUNTER — PRE-ADMISSION TESTING (OUTPATIENT)
Dept: PREADMISSION TESTING | Facility: HOSPITAL | Age: 59
End: 2024-06-17
Payer: COMMERCIAL

## 2024-06-17 ENCOUNTER — LAB (OUTPATIENT)
Dept: LAB | Facility: LAB | Age: 59
End: 2024-06-17
Payer: COMMERCIAL

## 2024-06-17 ENCOUNTER — OFFICE VISIT (OUTPATIENT)
Dept: SLEEP MEDICINE | Facility: HOSPITAL | Age: 59
End: 2024-06-17
Payer: COMMERCIAL

## 2024-06-17 VITALS
SYSTOLIC BLOOD PRESSURE: 124 MMHG | HEART RATE: 70 BPM | BODY MASS INDEX: 47.47 KG/M2 | WEIGHT: 268 LBS | OXYGEN SATURATION: 97 % | DIASTOLIC BLOOD PRESSURE: 85 MMHG | TEMPERATURE: 97.6 F

## 2024-06-17 VITALS
WEIGHT: 267.42 LBS | TEMPERATURE: 98.2 F | OXYGEN SATURATION: 99 % | HEIGHT: 63 IN | DIASTOLIC BLOOD PRESSURE: 80 MMHG | BODY MASS INDEX: 47.38 KG/M2 | RESPIRATION RATE: 16 BRPM | HEART RATE: 64 BPM | SYSTOLIC BLOOD PRESSURE: 137 MMHG

## 2024-06-17 DIAGNOSIS — M75.101 TEAR OF RIGHT ROTATOR CUFF, UNSPECIFIED TEAR EXTENT, UNSPECIFIED WHETHER TRAUMATIC: ICD-10-CM

## 2024-06-17 DIAGNOSIS — Z01.818 PREOP TESTING: Primary | ICD-10-CM

## 2024-06-17 DIAGNOSIS — M75.101 UNSPECIFIED ROTATOR CUFF TEAR OR RUPTURE OF RIGHT SHOULDER, NOT SPECIFIED AS TRAUMATIC: ICD-10-CM

## 2024-06-17 DIAGNOSIS — G47.00 INSOMNIA, UNSPECIFIED TYPE: ICD-10-CM

## 2024-06-17 DIAGNOSIS — Z01.818 PREOP TESTING: ICD-10-CM

## 2024-06-17 DIAGNOSIS — E66.01 MORBID OBESITY (MULTI): ICD-10-CM

## 2024-06-17 DIAGNOSIS — G47.33 OSA (OBSTRUCTIVE SLEEP APNEA): Primary | ICD-10-CM

## 2024-06-17 LAB
ANION GAP SERPL CALC-SCNC: 12 MMOL/L (ref 10–20)
ATRIAL RATE: 61 BPM
BUN SERPL-MCNC: 18 MG/DL (ref 6–23)
CALCIUM SERPL-MCNC: 9.4 MG/DL (ref 8.6–10.3)
CHLORIDE SERPL-SCNC: 103 MMOL/L (ref 98–107)
CO2 SERPL-SCNC: 28 MMOL/L (ref 21–32)
CREAT SERPL-MCNC: 0.71 MG/DL (ref 0.5–1.05)
EGFRCR SERPLBLD CKD-EPI 2021: >90 ML/MIN/1.73M*2
ERYTHROCYTE [DISTWIDTH] IN BLOOD BY AUTOMATED COUNT: 13.4 % (ref 11.5–14.5)
GLUCOSE SERPL-MCNC: 92 MG/DL (ref 74–99)
HCT VFR BLD AUTO: 38.1 % (ref 36–46)
HGB BLD-MCNC: 12.7 G/DL (ref 12–16)
MCH RBC QN AUTO: 29.4 PG (ref 26–34)
MCHC RBC AUTO-ENTMCNC: 33.3 G/DL (ref 32–36)
MCV RBC AUTO: 88 FL (ref 80–100)
NRBC BLD-RTO: NORMAL /100{WBCS}
P AXIS: 16 DEGREES
P OFFSET: 182 MS
P ONSET: 125 MS
PLATELET # BLD AUTO: 281 X10*3/UL (ref 150–450)
POTASSIUM SERPL-SCNC: 4.3 MMOL/L (ref 3.5–5.3)
PR INTERVAL: 182 MS
Q ONSET: 216 MS
QRS COUNT: 10 BEATS
QRS DURATION: 90 MS
QT INTERVAL: 424 MS
QTC CALCULATION(BAZETT): 426 MS
QTC FREDERICIA: 426 MS
R AXIS: 38 DEGREES
RBC # BLD AUTO: 4.32 X10*6/UL (ref 4–5.2)
SODIUM SERPL-SCNC: 139 MMOL/L (ref 136–145)
T AXIS: 31 DEGREES
T OFFSET: 428 MS
TSH SERPL DL<=0.05 MIU/L-ACNC: 2.87 MIU/L (ref 0.27–4.2)
VENTRICULAR RATE: 61 BPM
WBC # BLD AUTO: 7.7 X10*3/UL (ref 4.4–11.3)

## 2024-06-17 PROCEDURE — 93005 ELECTROCARDIOGRAM TRACING: CPT

## 2024-06-17 PROCEDURE — 99213 OFFICE O/P EST LOW 20 MIN: CPT | Mod: 24

## 2024-06-17 PROCEDURE — 3079F DIAST BP 80-89 MM HG: CPT

## 2024-06-17 PROCEDURE — G2211 COMPLEX E/M VISIT ADD ON: HCPCS

## 2024-06-17 PROCEDURE — 85027 COMPLETE CBC AUTOMATED: CPT

## 2024-06-17 PROCEDURE — 84443 ASSAY THYROID STIM HORMONE: CPT

## 2024-06-17 PROCEDURE — 99213 OFFICE O/P EST LOW 20 MIN: CPT

## 2024-06-17 PROCEDURE — 80048 BASIC METABOLIC PNL TOTAL CA: CPT

## 2024-06-17 PROCEDURE — 36415 COLL VENOUS BLD VENIPUNCTURE: CPT

## 2024-06-17 PROCEDURE — 1036F TOBACCO NON-USER: CPT

## 2024-06-17 PROCEDURE — 3074F SYST BP LT 130 MM HG: CPT

## 2024-06-17 RX ORDER — ACETAMINOPHEN 500 MG
1000 TABLET ORAL EVERY 6 HOURS PRN
COMMUNITY

## 2024-06-17 SDOH — ECONOMIC STABILITY: FOOD INSECURITY: WITHIN THE PAST 12 MONTHS, YOU WORRIED THAT YOUR FOOD WOULD RUN OUT BEFORE YOU GOT MONEY TO BUY MORE.: NEVER TRUE

## 2024-06-17 SDOH — ECONOMIC STABILITY: FOOD INSECURITY: WITHIN THE PAST 12 MONTHS, THE FOOD YOU BOUGHT JUST DIDN'T LAST AND YOU DIDN'T HAVE MONEY TO GET MORE.: NEVER TRUE

## 2024-06-17 ASSESSMENT — SLEEP AND FATIGUE QUESTIONNAIRES
HOW LIKELY ARE YOU TO NOD OFF OR FALL ASLEEP WHILE SITTING AND READING: WOULD NEVER DOZE
HOW LIKELY ARE YOU TO NOD OFF OR FALL ASLEEP IN A CAR, WHILE STOPPED FOR A FEW MINUTES IN TRAFFIC: WOULD NEVER DOZE
DIFFICULTY_FALLING_ASLEEP: MODERATE
SATISFACTION_WITH_CURRENT_SLEEP_PATTERN: VERY SATISFIED
SLEEP_PROBLEM_INTERFERES_DAILY_ACTIVITIES: SOMEWHAT
DIFFICULTY_STAYING_ASLEEP: MILD
SITING INACTIVE IN A PUBLIC PLACE LIKE A CLASS ROOM OR A MOVIE THEATER: WOULD NEVER DOZE
HOW LIKELY ARE YOU TO NOD OFF OR FALL ASLEEP WHILE SITTING QUIETLY AFTER LUNCH WITHOUT ALCOHOL: WOULD NEVER DOZE
HOW LIKELY ARE YOU TO NOD OFF OR FALL ASLEEP WHEN YOU ARE A PASSENGER IN A CAR FOR AN HOUR WITHOUT A BREAK: WOULD NEVER DOZE
HOW LIKELY ARE YOU TO NOD OFF OR FALL ASLEEP WHILE WATCHING TV: WOULD NEVER DOZE
ESS-CHAD TOTAL SCORE: 2
WAKING_TOO_EARLY: MILD
HOW LIKELY ARE YOU TO NOD OFF OR FALL ASLEEP WHILE SITTING AND TALKING TO SOMEONE: WOULD NEVER DOZE
SLEEP_PROBLEM_NOTICEABLE_TO_OTHERS: SOMEWHAT
HOW LIKELY ARE YOU TO NOD OFF OR FALL ASLEEP WHILE LYING DOWN TO REST IN THE AFTERNOON WHEN CIRCUMSTANCES PERMIT: MODERATE CHANCE OF DOZING
WORRIED_DISTRESSED_DUE_TO_SLEEP: A LITTLE

## 2024-06-17 ASSESSMENT — PATIENT HEALTH QUESTIONNAIRE - PHQ9
1. LITTLE INTEREST OR PLEASURE IN DOING THINGS: NOT AT ALL
2. FEELING DOWN, DEPRESSED OR HOPELESS: SEVERAL DAYS
SUM OF ALL RESPONSES TO PHQ9 QUESTIONS 1 & 2: 1
10. IF YOU CHECKED OFF ANY PROBLEMS, HOW DIFFICULT HAVE THESE PROBLEMS MADE IT FOR YOU TO DO YOUR WORK, TAKE CARE OF THINGS AT HOME, OR GET ALONG WITH OTHER PEOPLE: NOT DIFFICULT AT ALL

## 2024-06-17 ASSESSMENT — DUKE ACTIVITY SCORE INDEX (DASI)
CAN YOU WALK INDOORS, SUCH AS AROUND YOUR HOUSE: YES
DASI METS SCORE: 8.9
CAN YOU DO HEAVY WORK AROUND THE HOUSE LIKE SCRUBBING FLOORS OR LIFTING AND MOVING HEAVY FURNITURE: YES
CAN YOU WALK A BLOCK OR TWO ON LEVEL GROUND: YES
CAN YOU PARTICIPATE IN STRENOUS SPORTS LIKE SWIMMING, SINGLES TENNIS, FOOTBALL, BASKETBALL, OR SKIING: YES
CAN YOU DO MODERATE WORK AROUND THE HOUSE LIKE VACUUMING, SWEEPING FLOORS OR CARRYING GROCERIES: YES
CAN YOU RUN A SHORT DISTANCE: NO
TOTAL_SCORE: 50.2
CAN YOU DO LIGHT WORK AROUND THE HOUSE LIKE DUSTING OR WASHING DISHES: YES
CAN YOU PARTICIPATE IN STRENOUS SPORTS LIKE SWIMMING, SINGLES TENNIS, FOOTBALL, BASKETBALL, OR SKIING: YES
CAN YOU WALK A BLOCK OR TWO ON LEVEL GROUND: YES
CAN YOU PARTICIPATE IN MODERATE RECREATIONAL ACTIVITIES LIKE GOLF, BOWLING, DANCING, DOUBLES TENNIS OR THROWING A BASEBALL OR FOOTBALL: YES
CAN YOU HAVE SEXUAL RELATIONS: YES
CAN YOU DO MODERATE WORK AROUND THE HOUSE LIKE VACUUMING, SWEEPING FLOORS OR CARRYING GROCERIES: YES
CAN YOU WALK INDOORS, SUCH AS AROUND YOUR HOUSE: YES
CAN YOU CLIMB A FLIGHT OF STAIRS OR WALK UP A HILL: YES
CAN YOU CLIMB A FLIGHT OF STAIRS OR WALK UP A HILL: YES
CAN YOU PARTICIPATE IN MODERATE RECREATIONAL ACTIVITIES LIKE GOLF, BOWLING, DANCING, DOUBLES TENNIS OR THROWING A BASEBALL OR FOOTBALL: YES
CAN YOU DO LIGHT WORK AROUND THE HOUSE LIKE DUSTING OR WASHING DISHES: YES
CAN YOU DO YARD WORK LIKE RAKING LEAVES, WEEDING OR PUSHING A MOWER: YES
CAN YOU TAKE CARE OF YOURSELF (EAT, DRESS, BATHE, OR USE TOILET): YES
CAN YOU RUN A SHORT DISTANCE: NO
CAN YOU TAKE CARE OF YOURSELF (EAT, DRESS, BATHE, OR USE TOILET): YES
CAN YOU DO HEAVY WORK AROUND THE HOUSE LIKE SCRUBBING FLOORS OR LIFTING AND MOVING HEAVY FURNITURE: YES
CAN YOU DO YARD WORK LIKE RAKING LEAVES, WEEDING OR PUSHING A MOWER: YES

## 2024-06-17 ASSESSMENT — LIFESTYLE VARIABLES
HOW OFTEN DO YOU HAVE A DRINK CONTAINING ALCOHOL: NEVER
AUDIT-C TOTAL SCORE: 0
HOW MANY STANDARD DRINKS CONTAINING ALCOHOL DO YOU HAVE ON A TYPICAL DAY: PATIENT DOES NOT DRINK
HOW OFTEN DO YOU HAVE SIX OR MORE DRINKS ON ONE OCCASION: NEVER
SKIP TO QUESTIONS 9-10: 1

## 2024-06-17 ASSESSMENT — PAIN DESCRIPTION - DESCRIPTORS: DESCRIPTORS: SHARP;SHOOTING

## 2024-06-17 ASSESSMENT — PAIN - FUNCTIONAL ASSESSMENT: PAIN_FUNCTIONAL_ASSESSMENT: 0-10

## 2024-06-17 ASSESSMENT — PAIN SCALES - GENERAL: PAINLEVEL_OUTOF10: 0 - NO PAIN

## 2024-06-17 NOTE — PREPROCEDURE INSTRUCTIONS
Medication List            Accurate as of June 17, 2024  9:30 AM. Always use your most recent med list.                acetaminophen 500 mg tablet  Commonly known as: Tylenol  Medication Adjustments for Surgery: Take morning of surgery with sip of water, no other fluids     allopurinol 300 mg tablet  Commonly known as: Zyloprim  TAKE 1 TABLET BY MOUTH EVERY DAY  Medication Adjustments for Surgery: Take morning of surgery with sip of water, no other fluids     celecoxib 200 mg capsule  Commonly known as: CeleBREX  TAKE 1 CAPSULE (200 MG) BY MOUTH ONCE DAILY  Medication Adjustments for Surgery: Stop 7 days before surgery  Notes to patient: Last dose preoperatively 6/23/2024     diclofenac sodium 1 % gel  Commonly known as: Voltaren  APPLY A SMALL AMOUNT TO THE PAINFUL AREA 3 TIMES A DAY AS NEEDED  Medication Adjustments for Surgery: Stop 7 days before surgery  Notes to patient: Last dose preoperatively 6/23/2024     DULoxetine 20 mg DR capsule  Commonly known as: Cymbalta  Take 1 capsule (20 mg) by mouth once daily. Do not crush or chew.  Medication Adjustments for Surgery: Take morning of surgery with sip of water, no other fluids     ketoconazole 2 % cream  Commonly known as: NIZOral  Apply topically 2 times a day.  Medication Adjustments for Surgery: Other (Comment)     levothyroxine 125 mcg tablet  Commonly known as: Synthroid, Levoxyl  TAKE 1 TABLET BY MOUTH EVERY DAY  Medication Adjustments for Surgery: Take morning of surgery with sip of water, no other fluids     liothyronine 5 mcg tablet  Commonly known as: Cytomel  Take 1 tablet (5 mcg) by mouth 3 times a day.  Medication Adjustments for Surgery: Take morning of surgery with sip of water, no other fluids     traZODone 50 mg tablet  Commonly known as: Desyrel  TAKE 1/2 TABLET BY MOUTH AT BEDTIME  Medication Adjustments for Surgery: Take morning of surgery with sip of water, no other fluids     Xiidra 5 % dropperette  Generic drug: lifitegrast  Administer 1  drop into affected eye(s) once daily.  Medication Adjustments for Surgery: Other (Comment)            NPO Instructions:     Do not eat any food after midnight the night before your surgery/procedure.  You may have clear liquids until TWO hours before surgery/procedure. This includes water, black tea/coffee, (no milk or cream) apple juice and electrolyte drinks (Gatorade).  You may chew gum up to TWO hours before your surgery/procedure.     Additional Instructions:      Seven/Six Days before Surgery:  Review your medication instructions, stop indicated medications  Five Days before Surgery:  Review your medication instructions, stop indicated medications  Three Days before Surgery:  Review your medication instructions, stop indicated medications  The Day before Surgery:  Review your medication instructions, stop indicated medications  You will be contacted regarding the time of your arrival to facility and surgery time  Do not eat any food after Midnight  Day of Surgery:  Review your medication instructions, take indicated medications  If you have diabetes, please check your fasting blood sugar upon awakening.  If fasting blood sugar is <80 mg/dl, drink 100 ml of apple juice, time limit of 2 hours before  You may have clear liquids until TWO hours before surgery/procedure.  This includes water, black tea/coffee, (no milk or cream) apple juice and electrolyte drinks (Gatorade)  You may chew gum up to TWO hours before your surgery/procedure  Wear  comfortable loose fitting clothing  Do not use moisturizers, creams, lotions or perfume  All jewelry and valuables should be left at home     CONTACT SURGEON'S OFFICE IF YOU DEVELOP:  * Fever = 100.4 F   * New respiratory symptoms (e.g. cough, shortness of breath, respiratory distress, sore throat)  * Recent loss of taste or smell  *Flu like symptoms such as headache, fatigue or gastrointestinal symptoms  * You develop any open sores, shingles, burning or painful urination    AND/OR:  * You no longer wish to have the surgery.  * Any other personal circumstances change that may lead to the need to cancel or defer this surgery.  *You were admitted to any hospital within one week of your planned procedure.     SMOKING:  *Quitting smoking can make a huge difference to your health and recovery from surgery.    *If you need help with quitting, call 4-883-QUIT-NOW.     THE DAY BEFORE SURGERY:  *Do not eat any food after midnight the night before your surgery.   *You may have up to TEN OUNCES of clear liquids until TWO hours before your instructed ARRIVAL TIME to hospital. This includes water, black tea/coffee, (no milk or cream) apple juice, clear broth and electrolyte drinks (Gatorade). Please avoid clear liquids that are red in color.   *You may chew gum/mints up to TWO hours before your surgery/procedure.     SURGICAL TIME:  *You will be contacted between 2 p.m. and 3 p.m. the business day before your surgery with your arrival time.  *If you haven't received a call by 3pm, call [phone]  *Scheduled surgery times may change and you will be notified if this occurs-check your personal voicemail for any updates.     ON THE MORNING OF SURGERY:  *Wear comfortable, loose fitting clothing.   *Do not use moisturizers, creams, lotions or perfume.  *All jewelry and valuables should be left at home.  *Prosthetic devices such as contact lenses, hearing aids, dentures, eyelash extensions, hairpins and body piercing must be removed before surgery.     BRING WITH YOU:  *Photo ID and insurance card  *Current list of medications and allergies  *Pacemaker/Defibrillator/Heart stent cards  *CPAP machine and mask  *Slings/splints/crutches  *Copy of your complete Advanced Directive/DHPOA-if applicable  *Neurostimulator implant remote     PARKING AND ARRIVAL:  *Check in at the Main Entrance desk and let them know you are here for surgery.     IF YOU ARE HAVING OUTPATIENT/SAME DAY SURGERY:  *A responsible  adult MUST accompany you at the time of discharge and stay with you for 24 hours after your surgery.  *You may NOT drive yourself home after surgery.  *You may use a taxi or ride sharing service ([employer], Uber) to return home ONLY if you are accompanied by a friend or family member.  *Instructions for resuming your medications will be provided by your surgeon.     Thank you for coming to Pre Admission testing.      If I have prescribe medication please don't forget to  at your pharmacy.      Any questions about today's visit call [phone] and leave a message in the general mailbox.     Patient instructed to ambulate as soon as possible postoperatively to decrease thromboembolic risk.     Osorio Baker, APRN-CNP     Thank you for visiting [facility].  If you have any changes to your health condition, please call the surgeons office to alert them and give them details of your symptoms.        Preoperative Fasting Guidelines     Why must I stop eating and drinking near surgery time?  With sedation, food or liquid in your stomach can enter your lungs causing serious complications  Increases nausea and vomiting     When do I need to stop eating and drinking before my surgery?  Do not eat any food after midnight the night before your surgery/procedure.  You may have up to TEN ounces of clear liquid until TWO hours before your instructed arrival time to the hospital.  This includes water, black tea/coffee, (no milk or cream) apple juice, and electrolyte drinks (Gatorade)  You may chew gum until TWO hours before your surgery/procedure        Additional Instructions:      The Day before Surgery:  -Review your medication instructions, stop indicated medications  -You will be contacted in the evening regarding the time of your arrival to facility and surgery time     Day of Surgery:  -Review your medication instructions, take indicated medications  -Wear comfortable loose fitting  clothing  -Do not use moisturizers, creams, lotions or perfume  -All jewelry and valuables should be left at home                   Preoperative Brain Exercises     What are brain exercises?  A brain exercise is any activity that engages your thinking (cognitive) skills.     What types of activities are considered brain exercises?  Jigsaw puzzles, crossword puzzles, word jumble, memory games, word search, and many more.  Many can be found free online or on your phone via a mobile nya.     Why should I do brain exercises before my surgery?  More recent research has shown brain exercise before surgery can lower the risk of postoperative delirium (confusion) which can be especially important for older adults.  Patients who did brain exercises for 5 to 10 hours the days before surgery, cut their risk of postoperative delirium in half up to 1 week after surgery.                         [facility]     Preoperative Deep Breathing Exercises     Why it is important to do deep breathing exercises before my surgery?  Deep breathing exercises strengthen your breathing muscles.  This helps you to recover after your surgery and decreases the chance of breathing complications.        How are the deep breathing exercises done?  Sit straight with your back supported.  Breathe in deeply and slowly through your nose. Your lower rib cage should expand and your abdomen may move forward.  Hold that breath for 3 to 5 seconds.  Breathe out through pursed lips, slowly and completely.  Rest and repeat 10 times every hour while awake.  Rest longer if you become dizzy or lightheaded.                      [facility]     Preoperative Deep Breathing Exercises     Why it is important to do deep breathing exercises before my surgery?  Deep breathing exercises strengthen your breathing muscles.  This helps you to recover after your surgery and decreases the chance of breathing complications.         How are the deep breathing exercises done?  Sit straight with your back supported.  Breathe in deeply and slowly through your nose. Your lower rib cage should expand and your abdomen may move forward.  Hold that breath for 3 to 5 seconds.  Breathe out through pursed lips, slowly and completely.  Rest and repeat 10 times every hour while awake.  Rest longer if you become dizzy or lightheaded.        Patient Information: Incentive Spirometer  What is an incentive spirometer?  An incentive spirometer is a device used before and after surgery to “exercise” your lungs.  It helps you to take deeper breaths to expand your lungs.  Below is an example of a basic incentive spirometer.  The device you receive may differ slightly but they all function the same.    Why do I need to use an incentive spirometer?  Using your incentive spirometer prepares your lungs for surgery and helps prevent lung problems after surgery.  How do I use my incentive spirometer?  When you're using your incentive spirometer, make sure to breathe through your mouth. If you breathe through your nose, the incentive spirometer won't work properly. You can hold your nose if you have trouble.  If you feel dizzy at any time, stop and rest. Try again at a later time.  Follow the steps below:  Set up your incentive spirometer, expand the flexible tubing and connect to the outlet.  Sit upright in a chair or bed. Hold the incentive spirometer at eye level.   Put the mouthpiece in your mouth and close your lips tightly around it. Slowly breathe out (exhale) completely.  Breathe in (inhale) slowly through your mouth as deeply as you can. As you take a breath, you will see the piston rise inside the large column. While the piston rises, the indicator should move upwards. It should stay in between the 2 arrows (see Figure).  Try to get the piston as high as you can, while keeping the indicator between the arrows.   If the indicator doesn't stay between the  arrows, you're breathing either too fast or too slow.  When you get it as high as you can, hold your breath for 10 seconds, or as long as possible. While you're holding your breath, the piston will slowly fall to the base of the spirometer.  Once the piston reaches the bottom of the spirometer, breathe out slowly through your mouth. Rest for a few seconds.  Repeat 10 times. Try to get the piston to the same level with each breath.  Repeat every hour while awake  You can carefully clean the outside of the mouthpiece with an alcohol wipe or soap and water.       Patient and Family Education             Ways You Can Help Prevent Blood Clots                    This handout explains some simple things you can do to help prevent blood clots.      Blood clots are blockages that can form in the body's veins. When a blood clot forms in your deep veins, it may be called a deep vein thrombosis, or DVT for short. Blood clots can happen in any part of the body where blood flows, but they are most common in the arms and legs. If a piece of a blood clot breaks free and travels to the lungs, it is called a pulmonary embolus (PE). A PE can be a very serious problem.         Being in the hospital or having surgery can raise your chances of getting a blood clot because you may not be well enough to move around as much as you normally do.         Ways you can help prevent blood clots in the hospital           Wearing SCDs. SCDs stands for Sequential Compression Devices.   SCDs are special sleeves that wrap around your legs  They attach to a pump that fills them with air to gently squeeze your legs every few minutes.   This helps return the blood in your legs to your heart.   SCDs should only be taken off when walking or bathing.   SCDs may not be comfortable, but they can help save your life.                                            Wearing compression stockings - if your doctor orders them. These special snug fitting stockings  gently squeeze your legs to help blood flow.       Walking. Walking helps move the blood in your legs.   If your doctor says it is ok, try walking the halls at least   5 times a day. Ask us to help you get up, so you don't fall.      Taking any blood thinning medicines your doctor orders.        Page 1 of 2            [facility]; 3/23   Ways you can help prevent blood clots at home         Wearing compression stockings - if your doctor orders them. ? Walking - to help move the blood in your legs.       Taking any blood thinning medicines your doctor orders.      Signs of a blood clot or PE        Tell your doctor or nurse know right away if you have of the problems listed below.    If you are at home, seek medical care right away. Call 911 for chest pain or problems breathing.                Signs of a blood clot (DVT) - such as pain,  swelling, redness or warmth in your arm or leg      Signs of a pulmonary embolism (PE) - such as chest     pain or feeling short of breath

## 2024-06-17 NOTE — H&P (VIEW-ONLY)
"CPM/PAT Evaluation       Name: Parvin Garibay (Parvin Garibay \"Diana\")  /Age: 1965/59 y.o.     In-Person       Chief Complaint: Unspecified rotator cuff tear or rupture of right shoulder, not specified as traumatic     HPI  Patient is an alert and oriented year old female scheduled for a right shoulder arthroscopy/RCR, debridement, and bicep tenodesis on 2024 with Dr. Abebe for unspecified rotator cuff tear or rupture of right shoulder, not specified as traumatic. PMHX includes Hypothyroidism, WHITNEY, and morbid obesity. Presents to [facility] PAT today for perioperative risk stratification and optimization.     Past Medical History:   Diagnosis Date    Acute sinusitis 01/15/2024    Adenomatous polyp of colon 2023    Ankle pain 01/15/2024    Autoimmune thyroiditis 2014    Bilateral presbyopia 2023    Casts urinary hyaline 2023    Dysthymic disorder 2014    Encounter for other screening for malignant neoplasm of breast 10/22/2020    Breast cancer screening    History of metabolic disorder 01/15/2024    Hypothyroidism     Morbid obesity (Multi)     Nontraumatic complete tear of right rotator cuff 01/15/2024    Nontraumatic tear of rotator cuff 01/15/2024    WHITNEY (obstructive sleep apnea)     Other conditions influencing health status 2015    No significant past surgical history    Personal history of other diseases of the respiratory system     History of bronchitis    Personal history of other endocrine, nutritional and metabolic disease 2015    History of Hashimoto thyroiditis    Personal history of other endocrine, nutritional and metabolic disease     History of high cholesterol    Personal history of other endocrine, nutritional and metabolic disease     History of thyroid disorder    Right knee pain 2023    Tendinitis of right rotator cuff 2023     Past Surgical History:   Procedure Laterality Date    COLONOSCOPY      OTHER SURGICAL HISTORY  2019    No " Mag 1.0 on 6/1. IV magnesium 2gm given at 2050 [facility] on 6/1. Per Dr. Masoud Lopez, give IV magnesium 2 gm when pump removed 6/3 and start Mag Ox 400mg daily. My Chart message sent to Monika to inform her of above. Script sent to [facility]. history of surgery     No family history on file.    Allergies   Allergen Reactions    Adhesive Tape-Silicones Rash    Penicillins Rash    Rosuvastatin Other     Flu like symptoms    Simvastatin Other     Flu like symptoms    Statins-Hmg-Coa Reductase Inhibitors Other     Flu like symptoms    Sulfa (Sulfonamide Antibiotics) Rash    Sulfadiazine Rash       Prior to Admission medications    Medication Sig Start Date End Date Taking? Authorizing Provider   allopurinol (Zyloprim) 300 mg tablet TAKE 1 TABLET BY MOUTH EVERY DAY 11/20/23  Yes Mayela Gupta MD   celecoxib (CeleBREX) 200 mg capsule TAKE 1 CAPSULE (200 MG) BY MOUTH ONCE DAILY  Patient taking differently: Take 1 capsule (200 mg) by mouth once daily as needed. 1/16/24 7/14/24 Yes Mayela Gupta MD   diclofenac sodium (Voltaren) 1 % gel APPLY A SMALL AMOUNT TO THE PAINFUL AREA 3 TIMES A DAY AS NEEDED 3/22/24  Yes Mayela Gupta MD   DULoxetine (Cymbalta) 20 mg DR capsule Take 1 capsule (20 mg) by mouth once daily. Do not crush or chew. 3/22/24 9/18/24 Yes Mayela Gupta MD   ketoconazole (NIZOral) 2 % cream Apply topically 2 times a day. 3/22/24 3/22/25 Yes Mayela Gupta MD   levothyroxine (Synthroid, Levoxyl) 125 mcg tablet TAKE 1 TABLET BY MOUTH EVERY DAY 4/15/24  Yes Mayela Gupta MD   lifitegrast (Xiidra) 5 % dropperette Administer 1 drop into affected eye(s) once daily. 4/22/24  Yes Mayela Gupta MD   liothyronine (Cytomel) 5 mcg tablet Take 1 tablet (5 mcg) by mouth 3 times a day. 10/6/23  Yes Mayela Gupta MD   traZODone (Desyrel) 50 mg tablet TAKE 1/2 TABLET BY MOUTH AT BEDTIME 7/13/23 7/12/24 Yes Mayela Gupta MD   acetaminophen (Tylenol) 500 mg tablet Take 2 tablets (1,000 mg) by mouth every 6 hours if needed for mild pain (1 - 3).    Historical Provider, MD       Review of Systems   Constitutional: Negative for chills, decreased appetite, diaphoresis, fever and malaise/fatigue.   Eyes:  Negative for blurred vision and  double vision.   Cardiovascular:  Negative for chest pain, claudication, cyanosis, dyspnea on exertion, irregular heartbeat, leg swelling, near-syncope and palpitations.   Respiratory:  Negative for cough, hemoptysis, shortness of breath and wheezing.    Endocrine: Negative for cold intolerance, heat intolerance, polydipsia, polyphagia and polyuria.   Gastrointestinal:  Negative for abdominal pain, constipation, diarrhea, dysphagia, nausea and vomiting.   Genitourinary:  Negative for bladder incontinence, dysuria, hematuria, incomplete emptying, nocturia, pelvic pain and urgency.   Neurological:  Negative for headaches, light-headedness, paresthesias, sensory change and weakness.   Psychiatric/Behavioral:  Negative for altered mental status.    Musculoskeletal: Negative for myalgias. Positive for arthralgias. Limited ROM right shoulder     Vitals and nursing note reviewed.     Physical exam  Constitutional:       Appearance: Normal appearance. She is Morbidly Obese.   HENT:      Head: Normocephalic and atraumatic.      Mouth/Throat:      Mouth: Mucous membranes are moist.      Pharynx: Oropharynx is clear.   Eyes:      Extraocular Movements: Extraocular movements intact.      Conjunctiva/sclera: Conjunctivae normal.      Pupils: Pupils are equal, round, and reactive to light.   Cardiovascular:      Rate and Rhythm: Normal rate and regular rhythm.      Pulses: Normal pulses.      Heart sounds: Normal heart sounds.      No audible carotid bruit  Pulmonary:      Effort: Pulmonary effort is normal.      Breath sounds: Normal breath sounds.   Abdominal:      General: Abdomen is flat. Bowel sounds are normal.      Palpations: Abdomen is soft.   Musculoskeletal:      Cervical back: Normal range of motion and neck supple.   Skin:     General: Skin is warm and dry.      Capillary Refill: Capillary refill takes less than 2 seconds.   Neurological:      General: No focal deficit present.      Mental Status: She is alert and  "oriented to person, place, and time. Mental status is at baseline.   Psychiatric:         Mood and Affect: Mood normal.         Behavior: Behavior normal.         Thought Content: Thought content normal.         Judgment: Judgment normal.     Vitals and nursing note reviewed. Physical exam within normal limits.     Visit Vitals  /80   Pulse 64   Temp 36.8 °C (98.2 °F) (Temporal)   Resp 16   Ht 1.6 m (5' 3\")   Wt 121 kg (267 lb 6.7 oz)   SpO2 99%   BMI 47.37 kg/m²   Smoking Status Former   BSA 2.32 m²      DASI Risk Score      Flowsheet Row Most Recent Value   DASI SCORE 50.2   METS Score (Will be calculated only when all the questions are answered) 8.9          Caprini DVT Assessment      Flowsheet Row Most Recent Value   DVT Score 6   Current Status Major surgery planned, including arthroscopic and laproscopic (1-2 hours)   Age 40-59 years   BMI 41-50 (Morbid obesity)          Modified Frailty Index      Flowsheet Row Most Recent Value   Modified Frailty Index Calculator 0          CHADS2 Stroke Risk  Current as of 16 minutes ago        N/A 3 to 100%: High Risk   2 to < 3%: Medium Risk   0 to < 2%: Low Risk     Last Change: N/A          This score determines the patient's risk of having a stroke if the patient has atrial fibrillation.        This score is not applicable to this patient. Components are not calculated.          Revised Cardiac Risk Index      Flowsheet Row Most Recent Value   Revised Cardiac Risk Calculator 0          Apfel Simplified Score    No data to display       Risk Analysis Index Results This Encounter    No data found in the last 1 encounters.       Stop Bang Score      Flowsheet Row Most Recent Value   Do you snore loudly? 0  [not when wearing a cpap]   Do you often feel tired or fatigued after your sleep? 0   Has anyone ever observed you stop breathing in your sleep? 0   Do you have or are you being treated for high blood pressure? 0   Recent BMI (Calculated) 47.4   Is BMI greater " than 35 kg/m2? 1=Yes   Age older than 50 years old? 1=Yes   Is your neck circumference greater than 17 inches (Male) or 16 inches (Female)? 0   Gender - Male 0=No   STOP-BANG Total Score 2          Assessment & Plan:    Neuro:  No diagnosis or significant findings on chart review or clinical presentation and evaluation.     HEENT/Airway:  No significant findings on chart review or clinical presentation and evaluation.   History of WHITNEY-Compliant with CPAP  STOP-BANG Score-2 points low risk for WHITNEY    Mallampati::  III    TM distance::  >3 FB    Neck ROM::  Full  Dentures-denies  Crowns-denies  Implants-denies    Cardiovascular:  No diagnosis or significant findings on chart review or clinical presentation and evaluation.   METS: 8.9  RCRI: 0 points, 3.9%  risk for postoperative MACE   RAMYA: 0.8% risk for postoperative MACE  EKG -normal EKG, normal sinus rhythm Rate-61  No acute changes    Pulmonary:  No diagnosis or significant findings on chart review or clinical presentation and evaluation.   ARISCAT: <26 points, 1.6% risk of in-hospital postoperative pulmonary complication  PRODIGY: High risk for opioid induced respiratory depression  Smoking History-denies. Former smoker quit 10 years ago. Previously smoked 1ppd X 10 years    Renal:  No diagnosis or significant findings on chart review or clinical presentation and evaluation, however, the patient is at increased risk of perioperative renal complications secondary to age>/= 56. Preventative measures include BP monitoring, medication compliance, and hydration management.   BMP-reviewed    Endocrine:  No significant findings on chart review or clinical presentation and evaluation.   History of Hypothyroidism-Managed with Levothyroxine and Thyronine. Will get updated TSH preoperatively  TSH-2.87    [id number] 9/23/2023 resulted 5.4%    Hematologic/Immunology:  No diagnosis or significant findings on chart review or clinical presentation and evaluation.  CBC-  reviewed  HGB-12.7  Caprini Score 6, patient at High risk for postoperative DVT. Pt supplied education/VTE handout    Gastrointestinal:   No diagnosis or significant findings on chart review or clinical presentation and evaluation.   Alcohol use-denies  Drug use-denies    Infectious disease:   No diagnosis or significant findings on chart review or clinical presentation and evaluation.     Musculoskeletal:   No significant findings on chart review or clinical presentation and evaluation.   History of Morbid obesity-BMI 47.37  JHFRAT score-0 points. Low risk for falls    Anesthesia:  No anesthesia complications  No family history of anesthesia complications    Abnormal Findings on PAT evaluation  Morbid Obesity-BMI 47.37. Discussed with Dr Nhung Knott from anesthesia. Ok to proceed at this time.     Labs & Imaging ordered:  CBC, BMP, TSH, EKG  Nickel/metal allergy-negative  Shellfish allergy-negative    Overall, patient moderate risk for the scheduled moderate risk surgery. Discussed with patient medication instructions, NPO guidelines, and any questions or concerns.     Face to face time-30 minutes

## 2024-06-17 NOTE — CPM/PAT H&P
"CPM/PAT Evaluation       Name: Parvin Garibay (Parvin Garibay \"Diana\")  /Age: 1965/59 y.o.     In-Person       Chief Complaint: Unspecified rotator cuff tear or rupture of right shoulder, not specified as traumatic     HPI  Patient is an alert and oriented year old female scheduled for a right shoulder arthroscopy/RCR, debridement, and bicep tenodesis on 2024 with Dr. Abebe for unspecified rotator cuff tear or rupture of right shoulder, not specified as traumatic. PMHX includes Hypothyroidism, WHITNEY, and morbid obesity. Presents to [facility] PAT today for perioperative risk stratification and optimization.     Past Medical History:   Diagnosis Date    Acute sinusitis 01/15/2024    Adenomatous polyp of colon 2023    Ankle pain 01/15/2024    Autoimmune thyroiditis 2014    Bilateral presbyopia 2023    Casts urinary hyaline 2023    Dysthymic disorder 2014    Encounter for other screening for malignant neoplasm of breast 10/22/2020    Breast cancer screening    History of metabolic disorder 01/15/2024    Hypothyroidism     Morbid obesity (Multi)     Nontraumatic complete tear of right rotator cuff 01/15/2024    Nontraumatic tear of rotator cuff 01/15/2024    WHITNEY (obstructive sleep apnea)     Other conditions influencing health status 2015    No significant past surgical history    Personal history of other diseases of the respiratory system     History of bronchitis    Personal history of other endocrine, nutritional and metabolic disease 2015    History of Hashimoto thyroiditis    Personal history of other endocrine, nutritional and metabolic disease     History of high cholesterol    Personal history of other endocrine, nutritional and metabolic disease     History of thyroid disorder    Right knee pain 2023    Tendinitis of right rotator cuff 2023     Past Surgical History:   Procedure Laterality Date    COLONOSCOPY      OTHER SURGICAL HISTORY  2019    No " history of surgery     No family history on file.    Allergies   Allergen Reactions    Adhesive Tape-Silicones Rash    Penicillins Rash    Rosuvastatin Other     Flu like symptoms    Simvastatin Other     Flu like symptoms    Statins-Hmg-Coa Reductase Inhibitors Other     Flu like symptoms    Sulfa (Sulfonamide Antibiotics) Rash    Sulfadiazine Rash       Prior to Admission medications    Medication Sig Start Date End Date Taking? Authorizing Provider   allopurinol (Zyloprim) 300 mg tablet TAKE 1 TABLET BY MOUTH EVERY DAY 11/20/23  Yes Mayela Gupta MD   celecoxib (CeleBREX) 200 mg capsule TAKE 1 CAPSULE (200 MG) BY MOUTH ONCE DAILY  Patient taking differently: Take 1 capsule (200 mg) by mouth once daily as needed. 1/16/24 7/14/24 Yes Mayela Gupta MD   diclofenac sodium (Voltaren) 1 % gel APPLY A SMALL AMOUNT TO THE PAINFUL AREA 3 TIMES A DAY AS NEEDED 3/22/24  Yes Mayela Gupta MD   DULoxetine (Cymbalta) 20 mg DR capsule Take 1 capsule (20 mg) by mouth once daily. Do not crush or chew. 3/22/24 9/18/24 Yes Mayela Gupta MD   ketoconazole (NIZOral) 2 % cream Apply topically 2 times a day. 3/22/24 3/22/25 Yes Mayela Gupta MD   levothyroxine (Synthroid, Levoxyl) 125 mcg tablet TAKE 1 TABLET BY MOUTH EVERY DAY 4/15/24  Yes Mayela Gupta MD   lifitegrast (Xiidra) 5 % dropperette Administer 1 drop into affected eye(s) once daily. 4/22/24  Yes Mayela Gupta MD   liothyronine (Cytomel) 5 mcg tablet Take 1 tablet (5 mcg) by mouth 3 times a day. 10/6/23  Yes Mayela Gupta MD   traZODone (Desyrel) 50 mg tablet TAKE 1/2 TABLET BY MOUTH AT BEDTIME 7/13/23 7/12/24 Yes Mayela Gupta MD   acetaminophen (Tylenol) 500 mg tablet Take 2 tablets (1,000 mg) by mouth every 6 hours if needed for mild pain (1 - 3).    Historical Provider, MD       Review of Systems   Constitutional: Negative for chills, decreased appetite, diaphoresis, fever and malaise/fatigue.   Eyes:  Negative for blurred vision and  double vision.   Cardiovascular:  Negative for chest pain, claudication, cyanosis, dyspnea on exertion, irregular heartbeat, leg swelling, near-syncope and palpitations.   Respiratory:  Negative for cough, hemoptysis, shortness of breath and wheezing.    Endocrine: Negative for cold intolerance, heat intolerance, polydipsia, polyphagia and polyuria.   Gastrointestinal:  Negative for abdominal pain, constipation, diarrhea, dysphagia, nausea and vomiting.   Genitourinary:  Negative for bladder incontinence, dysuria, hematuria, incomplete emptying, nocturia, pelvic pain and urgency.   Neurological:  Negative for headaches, light-headedness, paresthesias, sensory change and weakness.   Psychiatric/Behavioral:  Negative for altered mental status.    Musculoskeletal: Negative for myalgias. Positive for arthralgias. Limited ROM right shoulder     Vitals and nursing note reviewed.     Physical exam  Constitutional:       Appearance: Normal appearance. She is Morbidly Obese.   HENT:      Head: Normocephalic and atraumatic.      Mouth/Throat:      Mouth: Mucous membranes are moist.      Pharynx: Oropharynx is clear.   Eyes:      Extraocular Movements: Extraocular movements intact.      Conjunctiva/sclera: Conjunctivae normal.      Pupils: Pupils are equal, round, and reactive to light.   Cardiovascular:      Rate and Rhythm: Normal rate and regular rhythm.      Pulses: Normal pulses.      Heart sounds: Normal heart sounds.      No audible carotid bruit  Pulmonary:      Effort: Pulmonary effort is normal.      Breath sounds: Normal breath sounds.   Abdominal:      General: Abdomen is flat. Bowel sounds are normal.      Palpations: Abdomen is soft.   Musculoskeletal:      Cervical back: Normal range of motion and neck supple.   Skin:     General: Skin is warm and dry.      Capillary Refill: Capillary refill takes less than 2 seconds.   Neurological:      General: No focal deficit present.      Mental Status: She is alert and  "oriented to person, place, and time. Mental status is at baseline.   Psychiatric:         Mood and Affect: Mood normal.         Behavior: Behavior normal.         Thought Content: Thought content normal.         Judgment: Judgment normal.     Vitals and nursing note reviewed. Physical exam within normal limits.     Visit Vitals  /80   Pulse 64   Temp 36.8 °C (98.2 °F) (Temporal)   Resp 16   Ht 1.6 m (5' 3\")   Wt 121 kg (267 lb 6.7 oz)   SpO2 99%   BMI 47.37 kg/m²   Smoking Status Former   BSA 2.32 m²      DASI Risk Score      Flowsheet Row Most Recent Value   DASI SCORE 50.2   METS Score (Will be calculated only when all the questions are answered) 8.9          Caprini DVT Assessment      Flowsheet Row Most Recent Value   DVT Score 6   Current Status Major surgery planned, including arthroscopic and laproscopic (1-2 hours)   Age 40-59 years   BMI 41-50 (Morbid obesity)          Modified Frailty Index      Flowsheet Row Most Recent Value   Modified Frailty Index Calculator 0          CHADS2 Stroke Risk  Current as of 16 minutes ago        N/A 3 to 100%: High Risk   2 to < 3%: Medium Risk   0 to < 2%: Low Risk     Last Change: N/A          This score determines the patient's risk of having a stroke if the patient has atrial fibrillation.        This score is not applicable to this patient. Components are not calculated.          Revised Cardiac Risk Index      Flowsheet Row Most Recent Value   Revised Cardiac Risk Calculator 0          Apfel Simplified Score    No data to display       Risk Analysis Index Results This Encounter    No data found in the last 1 encounters.       Stop Bang Score      Flowsheet Row Most Recent Value   Do you snore loudly? 0  [not when wearing a cpap]   Do you often feel tired or fatigued after your sleep? 0   Has anyone ever observed you stop breathing in your sleep? 0   Do you have or are you being treated for high blood pressure? 0   Recent BMI (Calculated) 47.4   Is BMI greater " than 35 kg/m2? 1=Yes   Age older than 50 years old? 1=Yes   Is your neck circumference greater than 17 inches (Male) or 16 inches (Female)? 0   Gender - Male 0=No   STOP-BANG Total Score 2          Assessment & Plan:    Neuro:  No diagnosis or significant findings on chart review or clinical presentation and evaluation.     HEENT/Airway:  No significant findings on chart review or clinical presentation and evaluation.   History of WHITNEY-Compliant with CPAP  STOP-BANG Score-2 points low risk for WHITNEY    Mallampati::  III    TM distance::  >3 FB    Neck ROM::  Full  Dentures-denies  Crowns-denies  Implants-denies    Cardiovascular:  No diagnosis or significant findings on chart review or clinical presentation and evaluation.   METS: 8.9  RCRI: 0 points, 3.9%  risk for postoperative MACE   RAMYA: 0.8% risk for postoperative MACE  EKG -normal EKG, normal sinus rhythm Rate-61  No acute changes    Pulmonary:  No diagnosis or significant findings on chart review or clinical presentation and evaluation.   ARISCAT: <26 points, 1.6% risk of in-hospital postoperative pulmonary complication  PRODIGY: High risk for opioid induced respiratory depression  Smoking History-denies. Former smoker quit 10 years ago. Previously smoked 1ppd X 10 years    Renal:  No diagnosis or significant findings on chart review or clinical presentation and evaluation, however, the patient is at increased risk of perioperative renal complications secondary to age>/= 56. Preventative measures include BP monitoring, medication compliance, and hydration management.   BMP-reviewed    Endocrine:  No significant findings on chart review or clinical presentation and evaluation.   History of Hypothyroidism-Managed with Levothyroxine and Thyronine. Will get updated TSH preoperatively  TSH-2.87    [id number] 9/23/2023 resulted 5.4%    Hematologic/Immunology:  No diagnosis or significant findings on chart review or clinical presentation and evaluation.  CBC-  reviewed  HGB-12.7  Caprini Score 6, patient at High risk for postoperative DVT. Pt supplied education/VTE handout    Gastrointestinal:   No diagnosis or significant findings on chart review or clinical presentation and evaluation.   Alcohol use-denies  Drug use-denies    Infectious disease:   No diagnosis or significant findings on chart review or clinical presentation and evaluation.     Musculoskeletal:   No significant findings on chart review or clinical presentation and evaluation.   History of Morbid obesity-BMI 47.37  JHFRAT score-0 points. Low risk for falls    Anesthesia:  No anesthesia complications  No family history of anesthesia complications    Abnormal Findings on PAT evaluation  Morbid Obesity-BMI 47.37. Discussed with Dr Nhung Knott from anesthesia. Ok to proceed at this time.     Labs & Imaging ordered:  CBC, BMP, TSH, EKG  Nickel/metal allergy-negative  Shellfish allergy-negative    Overall, patient moderate risk for the scheduled moderate risk surgery. Discussed with patient medication instructions, NPO guidelines, and any questions or concerns.     Face to face time-30 minutes

## 2024-06-17 NOTE — PATIENT INSTRUCTIONS
"       [facility] Sleep Medicine  [facility]  [address]  [facility] [address] [phone]       Thank you for coming to the Sleep Medicine Clinic today! Your sleep medicine provider today was: SAM Anna Below is a summary of your treatment plan, patient education, other important information, and our contact numbers.    Dear Parvin Garibay \"Diana\",    Thank you for visiting  Sleep Medicine Clinic !     1.You are doing great, we ordered the annual supplies for you. Feel free to contact your DME company to get new supplies.    2. Please do not drive when you are sleepy and  start practicing the sleep hygiene  as discussed in clinic today.     3. FOR QUESTIONS AND CONCERNS:   a) : In case of problems with machine or mask interface, please contact your DME company first. DME is the company who provides you the machine and/or PAP supplies / accessories. If [employer] is your DME, you can reach them at [phone].   b):  Please call my office with issues or questions: [phone] ([place]); [phone] ([facility]); [phone] ([place])    If you have a CPAP or BiPAP machine at home, please bring the unit and all accessories including the power cord to your appointments unless I tell you otherwise. Please have knowledge of the DME company you worked with to receive your PAP device. If you have copies of any previous sleep testing completed outside of , please bring with you to clinic as well. This information will make our visits more productive.     If you are new to CPAP or BiPAP, please note the minimum usage insurance requires to continuing coverage for the equipment as noted by your DME company. Please discuss equipment issues (PAP unit, mask fit, humidification, etc.) with your DME company first.       In the event that you are running more than 10 minutes late to your appointment, I will kindly ask you to " reschedule. Thanks.      TREATMENT PLAN     Follow-up Appointment:   With Dr. Edwards in August    PATIENT EDUCATION     OBSTRUCTIVE SLEEP APNEA (WHITNEY) is a sleep disorder where your upper airway muscles relax during sleep and the airway intermittently and repetitively narrows and collapses leading to partially blocked airway (hypopnea) or completely blocked airway (apnea) which, in turn, can disrupt breathing in sleep, lower oxygen levels while you sleep and cause night time wakings. Because both apnea and hypopnea may cause higher carbon dioxide or low oxygen levels, untreated WHITNEY can lead to heart arrhythmia, elevation of blood pressure, and make it harder for the body to consolidate memory and facilitate metabolism (leading to higher blood sugars at night). Frequent partial arousals occur during sleep resulting in sleep deprivation and daytime sleepiness. WHITNEY is associated with an increased risk of cardiovascular disease, stroke, hypertension, and insulin resistance. Moreover, untreated WHITNEY with excessive daytime sleepiness can increase the risk of motor vehicular accidents.    Below are conservative strategies for WHITNEY regardless of WHITNEY severity are:   Positional therapy - Avoid sleeping on your back.   Healthy diet and regular exercise to optimize weight is highly encouraged.   Avoid alcohol late in the evening and sedative-hypnotics as these substances can make sleep apnea worse.   Improve breathing through the nose with intranasal steroid spray, saline rinse, or antihistamines    Safety: Avoid driving vehicle and operating heavy equipment while sleepy. Drowsy driving may lead to life-threatening motor vehicle accidents. A person driving while sleepy is 5 times more likely to have an accident. If you feel sleepy, pull over and take a short power nap (sleep for less than 30 minutes). Otherwise, ask somebody to drive you.    Treatment options for sleep apnea include weight management, positional therapy, Positive  Airway Therapy (PAP) therapy, oral appliance therapy, hypoglossal nerve stimulator (Inspire) and select airway surgeries.    Sleep Testing for sleep apnea: The best and ideal way to check out if you have sleep apnea is to do an overnight sleep study in the sleep laboratory. Alternatively, a home sleep apnea test can also be done depending on your insurance and risk factors.     If you are having a home sleep apnea test, kindly allot 1 hour during pickup of the testing kit as you will have to complete paperwork and listen to the sleep technician for in-person on-the-spot demonstration and instructions on how to hook up the testing kit at home. Do the test for 1 day and start off with sleeping on your back. If you sleep on your side in the middle of night or you have always been a side or stomach-sleeper, it is ok as long as you have some time on your back and off-back.     If you are having an overnight in-lab sleep study, please make sure to bring toiletries, a comfy pillow, additional warm blankets, and any nighttime medications (include as-needed inhaler, pain pill, etc) that you may regularly take. Also, be sure to eat dinner before you arrive as we generally do not provide meals inside the sleep testing center. Lastly, in order to fall asleep faster in the sleep testing center, we advise patients to wake up 2 hours earlier on the morning of scheduled testing and avoid napping 2 days prior testing. Sometimes, your sleep provider may prescribe a sleep aid to be taken at lights out in the sleep testing center. If you are taking a sleep aid, consider having somebody pick you up after the sleep testing.    Overnight sleep studies may be scheduled on a weekday or weekend. We also perform daytime testing for shift workers on a case-by-case basis.    Once you have booked an appointment to do the sleep study, please contact my office for follow-up visit to discuss results.    On the other hand, if you have any of the  following, please consider calling the sleep testing center to RESCHEDULE your sleep study appointment:  If you tested positive for COVID within 10 days of your sleep study appointment.  If you were exposed to somebody who was confirmed for COVID within 10 days of your sleep study appointment and now you are having symptoms of possible COVID  If you have fever>100F or any acute symptoms that you think will lead to poor sleep during testing (e.g. new or worsening stuffy nose not relieved by steroid nasal spray)  If you have traveled domestically or internationally in the last month and now you are having symptoms of possible COVID    Starting Positive Airway Pressure (PAP): You were ordered a device to wear when you sleep called PAP (Positive Airway Pressure) to treat your sleep apnea. The order will be submitted to a durable medical equipment (DME) company who will arrange setting you up with the device. They will provide all the necessary equipment and discuss use and maintenance of the device with you as well as mask fitting and process of replacing / renewing PAP supplies or accessories. Once you get the machine, please start using it immediately. You may not be successful right away and that is okay. Brendan be certain that you keep trying nightly and reach out to DME if you are struggling or having issues with machine usage.     *Please follow-up with me in 1-2 months of starting CPAP to see how well it is working for you and to do some troubleshooting if needed. Also, please bring all PAP equipment with you to follow up appointments unless told otherwise.     Important things to keep in mind as you start PAP:  Insurance will monitor your usage during the first 90 days. You should use your PAP - all night, every night, and including all naps (especially if naps are more than 30 minutes) for your health. The bare minimum is to use your PAP device while sleeping for at least 4 hours per day at least 5-6 days per  week.. Otherwise, your PAP device will be reclaimed by your DME company at 90 days.  There are many masks to choose from to wear with your PAP machine. If you are not comfortable with the first mask issued to you, call your DME company and ask for another option to try. You typically have a 30-day mask guarantee from the day you received your machine.   Discuss with your provider if you are having issues breathing with the machine or if the temperature or humidity feel uncomfortable.  Expect to have an adjustment period when you start your device. It helps to continuing wearing the machine every day for a period of time until you get more used to it. You can practice with wearing the mask alone if you need, then add in the PAP air pressure a few days later.   Reach out for help if you are struggling! The sleep medicine department can be reached at [phone]  We encourage you to download data monitoring apps to your phone. For [employer] 10/11 - MyAir young. For [employer] - DreamMapper. Both apps are available in the Young store for free and are a great tool to monitor your progress with your PAP device night to night.    Tips for success with PAP machine usage:  Comfortable and well-fitting mask  Appropriate pressure on the machine  Using humidification  Support from bed partner and clinical team      Maintaining your CPAP/BPAP device:    The humidification chamber (aka water tank or water chamber) needs to be filled with distilled water to prevent buildup of white deposits in the future. If you cannot find distilled water, you can use tap water but expect to have white deposits buildup seen after prolonged use with tap water. If you start seeing white deposits on the water chamber, you can clean it by filling it with equal parts of distilled white vinegar and water. Let the vinegar-water mixture sit for 2 hours, and then rinse it with running tap water. Clean with soap and water then let it  dry.     You should try to keep your machine clean in order to work well. Here are some tips to clean PAP supplies / accessories:    Clean the humidification chamber (aka water tank) as well as your mask and tubing at least once a week with soap and water.   Alternatively, you can fill a sink or basin with warm water and add a little mild detergent, like Ivory dish soap. Gently wipe your supplies with the soapy water to free all the oils and dirt that may have collected. Once that's done, rinse these items with clean water until the soap is gone and let them air dry. You can hang your tubing over the curtain andrew in your bathroom so that it dries.  The mask insert (part of the mask that has contact with your skin) needs to be cleaned with soap and water daily. Another option is to wipe them down with CPAP wipes or baby wipes.    You should replace your mask and tubing frequently in order to prevent bacteria buildup, machine damage, and mask seal issues. The older the mask and hose, the high likelihood that there is bacteria buildup in it especially if they are not cleaned regularly. Dirty filters damage machines because build-up of dust and contaminants can cause machine to over-heat, and in time, damage the motor of machine. Cushions lose their seal over time as most masks are made of plastic and silicone while headgear is made of neoprene. These materials will break down with age and frequent use. Here is the recommended replacement schedule for PAP supplies / accessories:    Twice a month- disposable filters and cushions for nasal mask or nasal pillows.  Once a month- cushion for full face mask  Every 3 months- mask with headgear and PAP tubing (standard or heated hose)  Every 6 months- reusable filter, water chamber, and chin strap     Other useful information:    Some people do not put water in the tank while other people prefers to put water in the tank to prevent mouth dryness. Try to experiment to determine  which is more comfortable for you.   In general, new machines have 2 years warranty on parts while health insurance allows you to have a new machine once every 5 years.     Common issues with PAP machine:    Mask gets dislodged when turning to the side: Consider getting a CPAP pillow or switching to a mask with hose on top.     Dry mouth:  Your machine has built-in humidifier that heats up the air to prevent dry mouth. It can be adjusted to your comfort. You can try that first and increase setting one level one night at a time to check which setting is comfortable and effective in lessening dry mouth. In some patients with heated hose, adjusting tube temperature to make air warmer can improve dry mouth. If dry mouth persists despite adjusting humidity or tube temperature setting, may apply OTC Biotene gel over the gums at bedtime.  If Biotene gel is not effective, consider trying XEROSTOM gel from [employer].  Also, eliminate or reduce dose of medications that can cause dry mouth if possible. Lastly, may try getting a separate room humidifier machine.    Airleaks: Please call DME as they may need to adjust your mask or refit you with a different kind or different size of mask. In addition, you can ask DME for tips on getting a good mask seal and mask fit.     Difficulty tolerating the mask: Contact your DME to try a different kind of mask and/or call office to get a referral to Sleep Psychologist for CPAP desensitization. CPAP desensitization technique is a set of strategies that helps patient cope with claustrophobia and anxiety related to wearing mask. Alternatively, we can do a daytime mini-sleep study called PAP-nap trial wherein you will try on different kinds of mask and the sleep technician will try different pressure settings on CPAP and BPAP machines to see which specific pressure is tolerable and comfortable for you.     Water droplets or moisture within the hose and/or mask: This is called rain-out and it  "is caused by condensation of too much heated humidity on the cooler walls of the hose. If you have rain-out, turn down humidity settings or get a heated hose. If you already have a heated hose, turn up the \"tube temperature\" of the heated hose. Alternatively, if you don't want to get a heated hose or warmer air, may wrap the CPAP hose with stockings to keep it somewhat warm. Also, you need to place the machine on the floor and lower the hose so that water won't travel upward towards your mask.     You can also go to the following EDUCATION WEBSITES for further information:   American Academy of Sleep Medicine http://sleepeducation.org  National Sleep Foundation: https://sleepfoundation.org  American Sleep Apnea Association: https://www.sleepapnea.org (for patients with sleep apnea)  Narcolepsy Network: https://www.narcolepsynetwork.org (for patients with narcolepsy)  Aframe[employer]ole[employer] inc: https://www.2345.comcolepsy.org (for patients with narcolepsy)  Hypersomnia Foundation: https://www.hypersomniafoundation.org (for patients with idiopathic hypersomnia)  RLS foundation: https://www.rls.org (for patients with restless leg syndrome)    IMPORTANT INFORMATION     Call 911 for medical emergencies.  Our offices are generally open from Monday-Friday, 8 am - 5 pm.   There are no supporting services by either the sleep doctors or their staff on weekends and Holidays, or after 5 PM on weekdays.   If you need to get in touch with me, you may either call my office number or you can use DineInTime.  If a referral for a test, for CPAP, or for another specialist was made, and you have not heard about scheduling this within a week, please call scheduling at [phone] ([phone]).  If you are unable to make your appointment for clinic or an overnight study, kindly call the office or sleep testing center at least 48 hours in advance to cancel and reschedule.  If you are on CPAP, please bring your device's card and/or the device to each " clinic appointment.   In case of problems with PAP machine or mask interface, please contact your DME (Durable Medical Equipment) company first. DME is the company who provides you the machine and/or PAP supplies.       PRESCRIPTIONS     We require 7 days advanced notice for prescription refills. If we do not receive the request in this time, we cannot guarantee that your medication will be refilled in time.    IMPORTANT PHONE NUMBERS     Sleep Medicine Clinic Fax: [phone]  Appointments (for Pediatric Sleep Clinic): [phone] - option 1  Appointments (for Adult Sleep Clinic): [phone] - option 2  Appointments (For Sleep Studies): [phone] - option 3  Behavioral Sleep Medicine: [phone]  Sleep Surgery: [phone]  Nutrition Service: [phone]  Weight management clinics with endocrinology: [phone]  Bariatric Services: [phone] (includes weight loss medications and weight loss surgery)  Critical access hospital Network: [phone] (offers holistic approaches to weight management)  ENT (Otolaryngology): [phone]  Headache Clinic (Neurology): [phone]  Neurology: [phone]  Psychiatry: [phone]  Pulmonary Function Testing (PFT) Center: [phone]  Pulmonary Medicine: [phone]  [employer] (DME): [phone]      OUR SLEEP TESTING LOCATIONS     Our team will contact you to schedule your sleep study, however, you can contact us as follow:  Main Phone Line (scheduling only): [phone]), option 3  Adult and Pediatric Locations  [facility] (6 years and older): [facility] - 4th floor ([address] [place] [address]) After hours line: [phone]  [facility] (Main campus: All ages): [facility], 6th floor. After hours line: [phone]  [facility] (5 years and older; younger considered on case-by-case basis): [address] [facility]; [address], [address]. Scheduling  " After hours line: [phone]   [place] (6 years and older): [address]; [address]; [address]   [place] (6 years and older): [address], [address]  After hours line: [phone]   [place] (13 years and older) in [place]: [address], 2nd floor  After hours line: [phone]   [place] (13 year and older): [address]  After hours line: [phone]     Adult Only Locations:   [place] (18 years and older): [address] [facility], 2nd floor   [place] (18 years and older): [address] [facility]; 4th floor  After hours line: [phone]  [facility] (18 years and older) at [place]: [address] [facility]  After hours line: [phone]     CONTACTING YOUR SLEEP MEDICINE PROVIDER AND SLEEP TEAM      For issues with your machine or mask interface, please call your DME provider first. DME stands for durable medical company. DME is the company who provides you the machine and/or PAP supplies / accessories.   To schedule, cancel, or reschedule SLEEP STUDY APPOINTMENTS, please call the Main Phone Line at [phone] - option 3.   To schedule, cancel, or reschedule CLINIC APPOINTMENTS, you can do it in \"MyChart\", call [phone] to speak with my  (Deann Tate), or call the Main Phone Line at [phone] - option 2  For CLINICAL QUESTIONS or MEDICATION REFILLS, please call direct line for Adult Sleep Nurses at [phone].   Lastly, you can also send a message directly to your provider through \"My Chart\", which is the email service through your  Records Account: [email or web address]       Here at [facility], we wish you a restful sleep!   "

## 2024-06-17 NOTE — PERIOPERATIVE NURSING NOTE
6/17/24 Pt stated she got a shoulder brace from Dr. Abebe office but was not fitted.  She thinks it is too large.  Notified DME here and instructed pt to go to [facility] and go to second floor with brace and ask for [place].  Instructed pt to do prior to surgery.  Rodrigo HADLEY

## 2024-06-19 ENCOUNTER — OFFICE VISIT (OUTPATIENT)
Dept: PRIMARY CARE | Facility: CLINIC | Age: 59
End: 2024-06-19
Payer: COMMERCIAL

## 2024-06-19 ENCOUNTER — LAB (OUTPATIENT)
Dept: LAB | Facility: LAB | Age: 59
End: 2024-06-19
Payer: COMMERCIAL

## 2024-06-19 VITALS
SYSTOLIC BLOOD PRESSURE: 120 MMHG | TEMPERATURE: 97 F | BODY MASS INDEX: 47.13 KG/M2 | HEART RATE: 76 BPM | OXYGEN SATURATION: 96 % | WEIGHT: 266 LBS | DIASTOLIC BLOOD PRESSURE: 80 MMHG | HEIGHT: 63 IN

## 2024-06-19 DIAGNOSIS — E03.9 ACQUIRED HYPOTHYROIDISM: ICD-10-CM
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
## 2024-06-19 PROCEDURE — 36415 COLL VENOUS BLD VENIPUNCTURE: CPT

## 2024-06-19 PROCEDURE — 87476 LYME DIS DNA AMP PROBE: CPT

## 2024-06-19 ASSESSMENT — ENCOUNTER SYMPTOMS
FATIGUE: 0
ARTHRALGIAS: 1
RESPIRATORY NEGATIVE: 1
FEVER: 0
CARDIOVASCULAR NEGATIVE: 1
EYES NEGATIVE: 1
GASTROINTESTINAL NEGATIVE: 1
MYALGIAS: 1

## 2024-06-19 ASSESSMENT — PAIN SCALES - GENERAL: PAINLEVEL: 0-NO PAIN

## 2024-06-19 NOTE — PROGRESS NOTES
"Subjective   Patient ID: Parvin Garibay \"Sergo" is a 59 y.o. female.    Patient got bit by a tick 3 weeks ago and would like Lyme testing.  She took doxycycline.  She has no rash but she does have worsening of chronic arthralgias.        Review of Systems   Constitutional:  Negative for fatigue and fever.   HENT: Negative.     Eyes: Negative.    Respiratory: Negative.     Cardiovascular: Negative.    Gastrointestinal: Negative.    Genitourinary: Negative.    Musculoskeletal:  Positive for arthralgias and myalgias.   Skin: Negative.      Vitals:    06/19/24 1111   BP: 120/80   Pulse: 76   Temp: 36.1 °C (97 °F)   SpO2: 96%      Objective   Physical Exam  Constitutional:       Appearance: Normal appearance. She is obese.   Musculoskeletal:         General: No swelling.   Skin:     General: Skin is warm and dry.      Findings: No rash.   Neurological:      Mental Status: She is alert.         Assessment/Plan   Diagnoses and all orders for this visit:  Tick bite, unspecified site, initial encounter  -     Lyme Disease (Borrelia burgdorferi), PCR; Future      "

## 2024-06-22 LAB
B BURGDOR DNA SPEC QL NAA+PROBE: NOT DETECTED
SPECIMEN SOURCE: NORMAL
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
## 2024-06-25 PROBLEM — S30.861A TICK BITE OF ABDOMEN: Status: ACTIVE | Noted: 2024-06-25

## 2024-07-01 ENCOUNTER — HOSPITAL ENCOUNTER (OUTPATIENT)
Facility: HOSPITAL | Age: 59
Setting detail: OUTPATIENT SURGERY
Discharge: HOME | End: 2024-07-01
Attending: STUDENT IN AN ORGANIZED HEALTH CARE EDUCATION/TRAINING PROGRAM | Admitting: STUDENT IN AN ORGANIZED HEALTH CARE EDUCATION/TRAINING PROGRAM
Payer: COMMERCIAL

## 2024-07-01 ENCOUNTER — ANESTHESIA (OUTPATIENT)
Dept: OPERATING ROOM | Facility: HOSPITAL | Age: 59
End: 2024-07-01
Payer: COMMERCIAL

## 2024-07-01 ENCOUNTER — ANESTHESIA EVENT (OUTPATIENT)
Dept: OPERATING ROOM | Facility: HOSPITAL | Age: 59
End: 2024-07-01
Payer: COMMERCIAL

## 2024-07-01 VITALS
HEIGHT: 63 IN | WEIGHT: 266.76 LBS | HEART RATE: 68 BPM | SYSTOLIC BLOOD PRESSURE: 117 MMHG | BODY MASS INDEX: 47.27 KG/M2 | RESPIRATION RATE: 16 BRPM | DIASTOLIC BLOOD PRESSURE: 60 MMHG | TEMPERATURE: 96.8 F | OXYGEN SATURATION: 96 %

## 2024-07-01 DIAGNOSIS — S46.919A STRAIN OF SHOULDER, UNSPECIFIED LATERALITY, INITIAL ENCOUNTER: ICD-10-CM

## 2024-07-01 DIAGNOSIS — Z48.89 AFTERCARE FOLLOWING SURGERY: Primary | ICD-10-CM

## 2024-07-01 PROCEDURE — 3600000009 HC OR TIME - EACH INCREMENTAL 1 MINUTE - PROCEDURE LEVEL FOUR: Performed by: STUDENT IN AN ORGANIZED HEALTH CARE EDUCATION/TRAINING PROGRAM

## 2024-07-01 PROCEDURE — 3600000004 HC OR TIME - INITIAL BASE CHARGE - PROCEDURE LEVEL FOUR: Performed by: STUDENT IN AN ORGANIZED HEALTH CARE EDUCATION/TRAINING PROGRAM

## 2024-07-01 PROCEDURE — 2500000004 HC RX 250 GENERAL PHARMACY W/ HCPCS (ALT 636 FOR OP/ED): Performed by: STUDENT IN AN ORGANIZED HEALTH CARE EDUCATION/TRAINING PROGRAM

## 2024-07-01 PROCEDURE — 2500000004 HC RX 250 GENERAL PHARMACY W/ HCPCS (ALT 636 FOR OP/ED): Performed by: ANESTHESIOLOGY

## 2024-07-01 PROCEDURE — C1713 ANCHOR/SCREW BN/BN,TIS/BN: HCPCS | Performed by: STUDENT IN AN ORGANIZED HEALTH CARE EDUCATION/TRAINING PROGRAM

## 2024-07-01 PROCEDURE — 2720000007 HC OR 272 NO HCPCS: Performed by: STUDENT IN AN ORGANIZED HEALTH CARE EDUCATION/TRAINING PROGRAM

## 2024-07-01 PROCEDURE — 7100000009 HC PHASE TWO TIME - INITIAL BASE CHARGE: Performed by: STUDENT IN AN ORGANIZED HEALTH CARE EDUCATION/TRAINING PROGRAM

## 2024-07-01 PROCEDURE — 2780000003 HC OR 278 NO HCPCS: Performed by: STUDENT IN AN ORGANIZED HEALTH CARE EDUCATION/TRAINING PROGRAM

## 2024-07-01 PROCEDURE — 2500000005 HC RX 250 GENERAL PHARMACY W/O HCPCS: Performed by: STUDENT IN AN ORGANIZED HEALTH CARE EDUCATION/TRAINING PROGRAM

## 2024-07-01 PROCEDURE — A4649 SURGICAL SUPPLIES: HCPCS | Performed by: STUDENT IN AN ORGANIZED HEALTH CARE EDUCATION/TRAINING PROGRAM

## 2024-07-01 PROCEDURE — 29827 SHO ARTHRS SRG RT8TR CUF RPR: CPT | Performed by: STUDENT IN AN ORGANIZED HEALTH CARE EDUCATION/TRAINING PROGRAM

## 2024-07-01 PROCEDURE — 7100000010 HC PHASE TWO TIME - EACH INCREMENTAL 1 MINUTE: Performed by: STUDENT IN AN ORGANIZED HEALTH CARE EDUCATION/TRAINING PROGRAM

## 2024-07-01 PROCEDURE — 2500000005 HC RX 250 GENERAL PHARMACY W/O HCPCS: Performed by: ANESTHESIOLOGIST ASSISTANT

## 2024-07-01 PROCEDURE — 3700000001 HC GENERAL ANESTHESIA TIME - INITIAL BASE CHARGE: Performed by: STUDENT IN AN ORGANIZED HEALTH CARE EDUCATION/TRAINING PROGRAM

## 2024-07-01 PROCEDURE — 2500000004 HC RX 250 GENERAL PHARMACY W/ HCPCS (ALT 636 FOR OP/ED): Performed by: ANESTHESIOLOGIST ASSISTANT

## 2024-07-01 PROCEDURE — 7100000002 HC RECOVERY ROOM TIME - EACH INCREMENTAL 1 MINUTE: Performed by: STUDENT IN AN ORGANIZED HEALTH CARE EDUCATION/TRAINING PROGRAM

## 2024-07-01 PROCEDURE — 3700000002 HC GENERAL ANESTHESIA TIME - EACH INCREMENTAL 1 MINUTE: Performed by: STUDENT IN AN ORGANIZED HEALTH CARE EDUCATION/TRAINING PROGRAM

## 2024-07-01 PROCEDURE — 29827 SHO ARTHRS SRG RT8TR CUF RPR: CPT

## 2024-07-01 PROCEDURE — 7100000001 HC RECOVERY ROOM TIME - INITIAL BASE CHARGE: Performed by: STUDENT IN AN ORGANIZED HEALTH CARE EDUCATION/TRAINING PROGRAM

## 2024-07-01 DEVICE — SPEEDBRG IMP SYS W/BIO-COMP SWVLK & NDL
Type: IMPLANTABLE DEVICE | Site: SHOULDER | Status: FUNCTIONAL
Brand: ARTHREX®

## 2024-07-01 RX ORDER — MIDAZOLAM HYDROCHLORIDE 1 MG/ML
2 INJECTION, SOLUTION INTRAMUSCULAR; INTRAVENOUS ONCE
Status: COMPLETED | OUTPATIENT
Start: 2024-07-01 | End: 2024-07-01

## 2024-07-01 RX ORDER — HYDRALAZINE HYDROCHLORIDE 20 MG/ML
5 INJECTION INTRAMUSCULAR; INTRAVENOUS EVERY 30 MIN PRN
Status: DISCONTINUED | OUTPATIENT
Start: 2024-07-01 | End: 2024-07-01 | Stop reason: HOSPADM

## 2024-07-01 RX ORDER — ROCURONIUM BROMIDE 10 MG/ML
INJECTION, SOLUTION INTRAVENOUS AS NEEDED
Status: DISCONTINUED | OUTPATIENT
Start: 2024-07-01 | End: 2024-07-01

## 2024-07-01 RX ORDER — OXYCODONE AND ACETAMINOPHEN 5; 325 MG/1; MG/1
1 TABLET ORAL EVERY 6 HOURS PRN
Qty: 12 TABLET | Refills: 0 | Status: SHIPPED | OUTPATIENT
Start: 2024-07-01 | End: 2024-07-04

## 2024-07-01 RX ORDER — ALBUTEROL SULFATE 0.83 MG/ML
2.5 SOLUTION RESPIRATORY (INHALATION) ONCE AS NEEDED
Status: DISCONTINUED | OUTPATIENT
Start: 2024-07-01 | End: 2024-07-01 | Stop reason: HOSPADM

## 2024-07-01 RX ORDER — ONDANSETRON HYDROCHLORIDE 2 MG/ML
INJECTION, SOLUTION INTRAVENOUS AS NEEDED
Status: DISCONTINUED | OUTPATIENT
Start: 2024-07-01 | End: 2024-07-01

## 2024-07-01 RX ORDER — OXYCODONE HYDROCHLORIDE 5 MG/1
5 TABLET ORAL ONCE AS NEEDED
Status: CANCELLED | OUTPATIENT
Start: 2024-07-01

## 2024-07-01 RX ORDER — MEPERIDINE HYDROCHLORIDE 25 MG/ML
12.5 INJECTION INTRAMUSCULAR; INTRAVENOUS; SUBCUTANEOUS EVERY 10 MIN PRN
Status: DISCONTINUED | OUTPATIENT
Start: 2024-07-01 | End: 2024-07-01 | Stop reason: HOSPADM

## 2024-07-01 RX ORDER — CEFAZOLIN SODIUM IN 0.9 % NACL 3 G/100 ML
3 INTRAVENOUS SOLUTION, PIGGYBACK (ML) INTRAVENOUS ONCE
Status: COMPLETED | OUTPATIENT
Start: 2024-07-01 | End: 2024-07-01

## 2024-07-01 RX ORDER — FENTANYL CITRATE 50 UG/ML
INJECTION, SOLUTION INTRAMUSCULAR; INTRAVENOUS AS NEEDED
Status: DISCONTINUED | OUTPATIENT
Start: 2024-07-01 | End: 2024-07-01

## 2024-07-01 RX ORDER — BUPIVACAINE HYDROCHLORIDE 5 MG/ML
INJECTION, SOLUTION PERINEURAL AS NEEDED
Status: DISCONTINUED | OUTPATIENT
Start: 2024-07-01 | End: 2024-07-01

## 2024-07-01 RX ORDER — LABETALOL HYDROCHLORIDE 5 MG/ML
5 INJECTION, SOLUTION INTRAVENOUS ONCE AS NEEDED
Status: DISCONTINUED | OUTPATIENT
Start: 2024-07-01 | End: 2024-07-01 | Stop reason: HOSPADM

## 2024-07-01 RX ORDER — LIDOCAINE HYDROCHLORIDE AND EPINEPHRINE 10; 10 MG/ML; UG/ML
INJECTION, SOLUTION INFILTRATION; PERINEURAL AS NEEDED
Status: DISCONTINUED | OUTPATIENT
Start: 2024-07-01 | End: 2024-07-01 | Stop reason: HOSPADM

## 2024-07-01 RX ORDER — FENTANYL CITRATE 50 UG/ML
50 INJECTION, SOLUTION INTRAMUSCULAR; INTRAVENOUS ONCE
Status: COMPLETED | OUTPATIENT
Start: 2024-07-01 | End: 2024-07-01

## 2024-07-01 RX ORDER — FENTANYL CITRATE 50 UG/ML
50 INJECTION, SOLUTION INTRAMUSCULAR; INTRAVENOUS EVERY 5 MIN PRN
Status: DISCONTINUED | OUTPATIENT
Start: 2024-07-01 | End: 2024-07-01 | Stop reason: HOSPADM

## 2024-07-01 RX ORDER — DIPHENHYDRAMINE HYDROCHLORIDE 50 MG/ML
INJECTION INTRAMUSCULAR; INTRAVENOUS AS NEEDED
Status: DISCONTINUED | OUTPATIENT
Start: 2024-07-01 | End: 2024-07-01

## 2024-07-01 RX ORDER — PROPOFOL 10 MG/ML
INJECTION, EMULSION INTRAVENOUS AS NEEDED
Status: DISCONTINUED | OUTPATIENT
Start: 2024-07-01 | End: 2024-07-01

## 2024-07-01 RX ORDER — OMEPRAZOLE 20 MG/1
20 CAPSULE, DELAYED RELEASE ORAL
Qty: 5 CAPSULE | Refills: 0 | Status: SHIPPED | OUTPATIENT
Start: 2024-07-01 | End: 2024-07-06

## 2024-07-01 RX ORDER — DEXAMETHASONE SODIUM PHOSPHATE 10 MG/ML
INJECTION INTRAMUSCULAR; INTRAVENOUS AS NEEDED
Status: DISCONTINUED | OUTPATIENT
Start: 2024-07-01 | End: 2024-07-01

## 2024-07-01 RX ORDER — DOCUSATE SODIUM 100 MG/1
100 CAPSULE, LIQUID FILLED ORAL 2 TIMES DAILY PRN
Qty: 10 CAPSULE | Refills: 0 | Status: SHIPPED | OUTPATIENT
Start: 2024-07-01 | End: 2024-07-06

## 2024-07-01 RX ORDER — ASPIRIN 81 MG/1
81 TABLET ORAL DAILY
Qty: 14 TABLET | Refills: 0 | Status: SHIPPED | OUTPATIENT
Start: 2024-07-01 | End: 2024-07-15

## 2024-07-01 RX ORDER — SODIUM CHLORIDE, SODIUM LACTATE, POTASSIUM CHLORIDE, CALCIUM CHLORIDE 600; 310; 30; 20 MG/100ML; MG/100ML; MG/100ML; MG/100ML
100 INJECTION, SOLUTION INTRAVENOUS CONTINUOUS
Status: DISCONTINUED | OUTPATIENT
Start: 2024-07-01 | End: 2024-07-01 | Stop reason: HOSPADM

## 2024-07-01 RX ORDER — KETOROLAC TROMETHAMINE 30 MG/ML
INJECTION, SOLUTION INTRAMUSCULAR; INTRAVENOUS AS NEEDED
Status: DISCONTINUED | OUTPATIENT
Start: 2024-07-01 | End: 2024-07-01

## 2024-07-01 RX ORDER — PHENYLEPHRINE HCL IN 0.9% NACL 1 MG/10 ML
SYRINGE (ML) INTRAVENOUS AS NEEDED
Status: DISCONTINUED | OUTPATIENT
Start: 2024-07-01 | End: 2024-07-01

## 2024-07-01 RX ORDER — ONDANSETRON HYDROCHLORIDE 2 MG/ML
4 INJECTION, SOLUTION INTRAVENOUS ONCE AS NEEDED
Status: DISCONTINUED | OUTPATIENT
Start: 2024-07-01 | End: 2024-07-01 | Stop reason: HOSPADM

## 2024-07-01 RX ORDER — LIDOCAINE HYDROCHLORIDE 10 MG/ML
INJECTION INFILTRATION; PERINEURAL AS NEEDED
Status: DISCONTINUED | OUTPATIENT
Start: 2024-07-01 | End: 2024-07-01

## 2024-07-01 RX ORDER — DROPERIDOL 2.5 MG/ML
0.62 INJECTION, SOLUTION INTRAMUSCULAR; INTRAVENOUS ONCE AS NEEDED
Status: DISCONTINUED | OUTPATIENT
Start: 2024-07-01 | End: 2024-07-01 | Stop reason: HOSPADM

## 2024-07-01 SDOH — HEALTH STABILITY: MENTAL HEALTH: CURRENT SMOKER: 0

## 2024-07-01 ASSESSMENT — COLUMBIA-SUICIDE SEVERITY RATING SCALE - C-SSRS
1. IN THE PAST MONTH, HAVE YOU WISHED YOU WERE DEAD OR WISHED YOU COULD GO TO SLEEP AND NOT WAKE UP?: NO
2. HAVE YOU ACTUALLY HAD ANY THOUGHTS OF KILLING YOURSELF?: NO
6. HAVE YOU EVER DONE ANYTHING, STARTED TO DO ANYTHING, OR PREPARED TO DO ANYTHING TO END YOUR LIFE?: NO

## 2024-07-01 ASSESSMENT — PAIN - FUNCTIONAL ASSESSMENT
PAIN_FUNCTIONAL_ASSESSMENT: 0-10

## 2024-07-01 ASSESSMENT — PAIN SCALES - GENERAL
PAINLEVEL_OUTOF10: 4
PAINLEVEL_OUTOF10: 3
PAINLEVEL_OUTOF10: 0 - NO PAIN
PAINLEVEL_OUTOF10: 3
PAINLEVEL_OUTOF10: 1
PAINLEVEL_OUTOF10: 4
PAIN_LEVEL: 4
PAINLEVEL_OUTOF10: 3

## 2024-07-01 ASSESSMENT — PAIN DESCRIPTION - DESCRIPTORS: DESCRIPTORS: ACHING;DISCOMFORT

## 2024-07-01 NOTE — ANESTHESIA POSTPROCEDURE EVALUATION
"Patient: Parvin Garibay \"Diana\"    Procedure Summary       Date: 07/01/24 Room / Location: [facility] / Virtual [facility]    Anesthesia Start: 0711 Anesthesia Stop: 0905    Procedures:       Repair Arthroscopy Rotator Cuff Shoulder (Right: Shoulder)      Debridement Shoulder (Right: Shoulder)      Biceps tenodesis ( General plus block anesthesia, Beachchair vascular table, Regeneten ) (Right: Shoulder) Diagnosis:       Unspecified rotator cuff tear or rupture of right shoulder, not specified as traumatic      (Unspecified rotator cuff tear or rupture of right shoulder, not specified as traumatic [M75.101])    Surgeons: Omega Abebe MD Responsible Provider: Osorio Cuellar MD    Anesthesia Type: general, regional ASA Status: 3            Anesthesia Type: general, regional    Vitals Value Taken Time   /53 07/01/24 0915   Temp 36.1 °C (97 °F) 07/01/24 0904   Pulse 88 07/01/24 0915   Resp 26 07/01/24 0915   SpO2 95 % 07/01/24 0915       Anesthesia Post Evaluation    Patient location during evaluation: PACU  Patient participation: complete - patient participated  Level of consciousness: awake and alert  Pain score: 4  Pain management: adequate  Multimodal analgesia pain management approach  Airway patency: patent  Two or more strategies used to mitigate risk of obstructive sleep apnea  Cardiovascular status: acceptable and blood pressure returned to baseline  Respiratory status: acceptable  Hydration status: acceptable  Postoperative Nausea and Vomiting: none  Comments: Pt feels the block is partially working, there are some areas of discomfort but they are tolerable.        There were no known notable events for this encounter.    "

## 2024-07-01 NOTE — PERIOPERATIVE NURSING NOTE
Patient in Phase 2; dressed and up to chair with RN assist. Tolerating po fluids, minimal complaint of pain and minimal complaint of nausea.     Significant other at bedside; discussed discharge instructions with patient and Significant other. All questions at this time answered.     Patient clinically appropriate for discharge. IV removed and patient transported to discharge area via wheelchair.

## 2024-07-01 NOTE — ANESTHESIA PROCEDURE NOTES
Airway  Date/Time: 7/1/2024 7:25 AM  Urgency: elective    Airway not difficult    Staffing  Performed: WILMER   Authorized by: Osorio Cuellar MD    Performed by: WILMER Jovel  Patient location during procedure: OR    Indications and Patient Condition  Indications for airway management: anesthesia and airway protection  Spontaneous Ventilation: absent  Sedation level: deep  Preoxygenated: yes  Patient position: sniffing  MILS maintained throughout  Mask difficulty assessment: 1 - vent by mask  Planned trial extubation    Final Airway Details  Final airway type: endotracheal airway      Successful airway: ETT  Cuffed: yes   Successful intubation technique: direct laryngoscopy  Facilitating devices/methods: intubating stylet  Endotracheal tube insertion site: oral  Blade: Feliz  Blade size: #3  ETT size (mm): 7.0  Cormack-Lehane Classification: grade I - full view of glottis  Placement verified by: chest auscultation, capnometry and palpation of cuff   Measured from: lips  ETT to lips (cm): 22  Number of attempts at approach: 1

## 2024-07-01 NOTE — ANESTHESIA PREPROCEDURE EVALUATION
"Patient: Parvin Garibay \"Diana\"    Procedure Information       Date/Time: 07/01/24 0700    Procedures:       Repair Arthroscopy Rotator Cuff Shoulder (Right: Shoulder) - General plus block anesthesia      Debridement Shoulder (Right: Shoulder) - General plus block anesthesia      Biceps tenodesis ( General plus block anesthesia, Beachchair vascular table, Regeneten ) (Right: Shoulder) - General plus block anesthesia    Location: [facility] / [facility]    Surgeons: Omega Abebe MD            Relevant Problems   Cardiac   (+) HLD (hyperlipidemia)   (+) Hyperlipidemia   (+) Hypertension      Pulmonary   (+) Obstructive sleep apnea (adult) (pediatric)   (+) Obstructive sleep apnea syndrome      Endocrine   (+) Hypothyroid   (+) Morbid obesity (Multi)      Musculoskeletal   (+) Fibromyalgia   (+) Osteoarthritis of knee   (+) Osteoarthritis of left knee      HEENT   (+) Bilateral sensorineural hearing loss   (+) Sensorineural hearing loss (SNHL) of both ears      ID   (+) Candidiasis       Clinical information reviewed:                   NPO Detail:  No data recorded     Physical Exam    Airway  Mallampati: III  TM distance: >3 FB  Neck ROM: full     Cardiovascular   Comments: deferred   Dental    Pulmonary   Comments: deferred   Abdominal     Comments: deferred           Anesthesia Plan    History of general anesthesia?: yes  History of complications of general anesthesia?: no    ASA 3     general and regional   (ETT)  The patient is not a current smoker.  Education provided regarding risk of obstructive sleep apnea.  intravenous induction   Postoperative administration of opioids is intended.  Anesthetic plan and risks discussed with patient.    Plan discussed with CAA.      "

## 2024-07-01 NOTE — ANESTHESIA PROCEDURE NOTES
Peripheral Block    Patient location during procedure: pre-op  Start time: 7/1/2024 7:01 AM  End time: 7/1/2024 7:04 AM  Reason for block: at surgeon's request and post-op pain management  Staffing  Performed: attending   Authorized by: Osorio Cuellar MD    Performed by: Osorio Cuellar MD  Preanesthetic Checklist  Completed: patient identified, IV checked, site marked, risks and benefits discussed, surgical consent, monitors and equipment checked, pre-op evaluation and timeout performed   Timeout performed at: 7/1/2024 6:57 AM  Peripheral Block  Patient position: laying flat  Prep: alcohol swabs  Patient monitoring: heart rate, cardiac monitor and continuous pulse ox  Block type: interscalene  Laterality: right  Injection technique: single-shot  Guidance: nerve stimulator and ultrasound guided  Needle  Needle type: short-bevel   Needle gauge: 22 G  Needle length: 5 cm  Needle localization: anatomical landmarks, ultrasound guidance and nerve stimulator  Needle insertion depth: 4 cm  Assessment  Injection assessment: negative aspiration for heme, no paresthesia on injection, incremental injection and local visualized surrounding nerve on ultrasound  Paresthesia pain: none  Heart rate change: no  Slow fractionated injection: yes  Additional Notes  Dexamethasone 10 mg added to local

## 2024-07-01 NOTE — OP NOTE
"Repair Arthroscopy Rotator Cuff Shoulder (R), Debridement Shoulder (R), Biceps tenodesis ( General plus block anesthesia, Beachchair vascular table, Regeneten ) (R) Operative Note     Date: 2024  OR Location: [facility]    Name: Parvin Garibay \"Sergo", : 1965, Age: 59 y.o., MRN: [id number], Sex: female    Diagnosis  Pre-op Diagnosis     * Unspecified rotator cuff tear or rupture of right shoulder, not specified as traumatic [M75.101] Post-op Diagnosis     * Unspecified rotator cuff tear or rupture of right shoulder, not specified as traumatic [M75.101]     Procedures  Right shoulder arthroscopic rotator cuff repair    Right shoulder limited debridement      Surgeons      * Omega Abebe - Primary    Resident/Fellow/Other Assistant:  Jackie Spann MD fellow    Procedure Summary  Anesthesia: Regional, General  ASA: III  Anesthesia Staff: Anesthesiologist: Osorio Cuellar MD  C-AA: WILMER Jovel  Estimated Blood Loss: <5mL  Intra-op Medications:   Administrations occurring from 0700 to 0900 on 24:   Medication Name Total Dose   lidocaine-epinephrine (Xylocaine W/EPI) 1 %-1:100,000 injection 10 mL   EPINEPHrine (Adrenalin) 1 mg in sodium chloride 0.9 % 3,000 mL irrigation 4 mL   lactated Ringer's infusion Cannot be calculated   ceFAZolin in 0.9% sodium chloride (Ancef) IVPB 3 g 3 g              Anesthesia Record               Intraprocedure I/O Totals       None           Specimen: No specimens collected     Staff:   Circulator: Carmel Subramanian Person: Tammie Mcintosh Scrub: Minda          Implants:  Implants       Type Name Action Serial No.      Implant SPEEDBRIDGE SYSTEM, W/BIO-COMP, SWIVELOCK AND NDL - [id number] Implanted               Findings: ful thickness L shaped rotator cuff tear, 2cm    Indications: Diana Garibay is an 59 y.o. female who is having surgery for Unspecified rotator cuff tear or rupture of right shoulder, not specified as traumatic [M75.101].     The patient " was seen in the preoperative area. The risks, benefits, complications, treatment options, non-operative alternatives, expected recovery and outcomes were discussed with the patient. The possibilities of reaction to medication, pulmonary aspiration, injury to surrounding structures, bleeding, recurrent infection, the need for additional procedures, failure to diagnose a condition, and creating a complication requiring transfusion or operation were discussed with the patient. The patient concurred with the proposed plan, giving informed consent.  The site of surgery was properly noted/marked if necessary per policy. The patient has been actively warmed in preoperative area. Preoperative antibiotics have been ordered and given within 1 hours of incision. Venous thrombosis prophylaxis have been ordered including bilateral sequential compression devices    Procedure Details: INDICATIONS FOR PROCEDURE:   Parvin Garibay  is a 59 y.o. year-old female  with ongoing shoulder pain s/p failure of conservative treatment. MRI confirmed she had a massive what appeared to be acute rotator cuff tendon tear.  We recommended surgery given her age and acuity of tear.  We reviewed the risks of surgery including, but not limited to, bleeding, infection, injury to surrounding tissues, nerves,   vessels, DVT/PE, cuff nonhealing, late joint arthrosis; and the potential need for future surgery.  she understood fully and wished to proceed.       PROCEDURE IN DETAIL:   The patient was met in the preoperative holding area.  rightshoulder was identified as the correct operative limb by myself, the patient as well as attending anesthesiologist, marked with an indelible marker.  Informed consent was in the chart.  The patient received regional nerve block by Anesthesia, was taken back to the OR, placed supine on the operative bed in stable condition.  At this point, a formal interdisciplinary Huddle was carried out correctly identifying the  patient, procedure, correct operative limb, and all necessary equipment. All in agreement.  The patient was surrendered under general anesthetic. Airway was secured with an endotracheal tube. Once patient was asleep, she was positioned in the beach chair position with a foam face mask assuring neutral coronal and sagittal head neck alignment. Nonoperative arm was placed in a well-padded arm ott.  SCDs were placed.  Operative arm was prepped and draped.  Patient received Ancef for antibiotics.  A time-out was called.  The trimano arm was used to control the arm throughout the case.  I infiltrated the subacromial space with 10 mL 1% lidocaine with epinephrine.  We then made a posterior portal.  The arthroscope was placed into the joint.  An interval portal and cannula were established and diagnostic arthroscopy ensued.  The biceps had spontaneously ruptured.  Biceps stump was debrided with a shaver.  Debridement tolerated was done of the posterior labrum and some mild fraying of the glenoid cartilage.  The subscapularis was intact.  There was no loose bodies. There was a full-thickness cuff tear.  We then put the scope in subacromial space.  The bursectomy was completed.  We inspected the cuff tear.  It was a L type tear, 2cm, no retraction. Tuberoplasty was done down to bleeding bone for bleeding healing bed of tissue. Two 4.75mm SwiveLock anchors were then placed percutaneously anterior and posterior margins of the tear at the articular margin.  A posterior interval release was then completed between the supra and infra spinatus tendons to allow an anatomic reduction of the tear without dogear.  Tapes were then passed in a swedged fashion through the rotator cuff medialized and in appropriate to reduce it back on its footprint.  A SpeedBridge construct was then created by taking 1 tape from each anchor to a 4.75mm SwiveLock anchor laterally for double row repair.  This nicely compressed the rotator cuff back on  the footprint. This completed our rotator cuff repair.  A side-to-side repair of the margin convergence release was then accomplished with a #2 FiberWire.  Portals were closed with nylon.  At the end of the case, all sponge, needle, and instrument counts were correct.  She was then placed in abduction sling with a Polar Care unit.  She was extubated and taken to PACU   without complication. Please note Monica Perez PA-C served as the first surgical assist.  Her assistance in the case was critical for key portions including patient positioning and prep assistance with arthroscopic viewing, wound closure, brace application.       Complications:  None; patient tolerated the procedure well.    Disposition: PACU - hemodynamically stable.  Condition: stable         Additional Details: Will begin PT 2 weeks postop    Attending Attestation: I performed the procedure.    Omega Abebe  Phone Number: [phone]

## 2024-07-01 NOTE — ANESTHESIA PROCEDURE NOTES
Peripheral Block    Patient location during procedure: pre-op  Start time: 7/1/2024 7:05 AM  End time: 7/1/2024 7:06 AM  Reason for block: at surgeon's request and post-op pain management  Staffing  Performed: attending   Authorized by: Osorio Cuellar MD    Performed by: Osorio Cuellar MD  Preanesthetic Checklist  Completed: patient identified, IV checked, site marked, risks and benefits discussed, surgical consent, monitors and equipment checked, pre-op evaluation and timeout performed   Timeout performed at: 7/1/2024 6:57 AM  Peripheral Block  Patient position: laying flat  Prep: alcohol swabs  Patient monitoring: heart rate, cardiac monitor and continuous pulse ox  Block type: other (intercostobrachial)  Laterality: right  Injection technique: single-shot  Needle  Needle type: short-bevel   Needle gauge: 22 G  Needle length: 5 cm  Needle localization: ultrasound guidance  Assessment  Injection assessment: negative aspiration for heme, no paresthesia on injection and incremental injection  Paresthesia pain: none  Heart rate change: no  Slow fractionated injection: yes  Additional Notes  This was an intercostobrachial plexus block

## 2024-07-01 NOTE — BRIEF OP NOTE
"Date: 2024  OR Location: [facility]    Name: Parvin Garibay \"Sergo", : 1965, Age: 59 y.o., MRN: [id number], Sex: female    Diagnosis  Pre-op Diagnosis     * Unspecified rotator cuff tear or rupture of right shoulder, not specified as traumatic [M75.101] Post-op Diagnosis     * Unspecified rotator cuff tear or rupture of right shoulder, not specified as traumatic [M75.101]     Procedures  Right shoulder arthroscopic rotator cuff repair  2.   Right shoulder arthroscopic limited debridement      Surgeons      * Omega Abebe - Primary    Resident/Fellow/Other Assistant:  Surgeons and Role:     * CHU MccormickC - NORA First Assist  Tucker Spann MD    Procedure Summary  Anesthesia: Regional, General  ASA: III  Anesthesia Staff: Anesthesiologist: Osorio Cuellar MD  C-AA: WILMER Jovel  Estimated Blood Loss: <5 mL  Intra-op Medications:   Administrations occurring from 0700 to 0900 on 24:   Medication Name Total Dose   lidocaine-epinephrine (Xylocaine W/EPI) 1 %-1:100,000 injection 10 mL   EPINEPHrine (Adrenalin) 1 mg in sodium chloride 0.9 % 3,000 mL irrigation 4 mL   lactated Ringer's infusion Cannot be calculated   ceFAZolin in 0.9% sodium chloride (Ancef) IVPB 3 g 3 g              Anesthesia Record               Intraprocedure I/O Totals          Intake    lactated Ringer's infusion 950.00 mL    ceFAZolin in 0.9% sodium chloride (Ancef) IVPB 3 g 100.00 mL    Total Intake 1050 mL       Output    Est. Blood Loss 10 mL    Total Output 10 mL       Net    Net Volume 1040 mL          Specimen: No specimens collected     Staff:   Circulator: Carmel Subramanian Person: Tammie Mcintosh Scrub: Minda    Findings: See full operative report    Complications:  None; patient tolerated the procedure well.     Disposition: PACU - hemodynamically stable.  Condition: stable  Specimens Collected: No specimens collected    "

## 2024-07-02 ASSESSMENT — PAIN SCALES - GENERAL: PAINLEVEL_OUTOF10: 0 - NO PAIN

## 2024-07-12 ENCOUNTER — APPOINTMENT (OUTPATIENT)
Dept: ORTHOPEDIC SURGERY | Facility: CLINIC | Age: 59
End: 2024-07-12
Payer: COMMERCIAL

## 2024-07-12 DIAGNOSIS — Z48.89 AFTERCARE FOLLOWING SURGERY: Primary | ICD-10-CM

## 2024-07-12 PROCEDURE — 99024 POSTOP FOLLOW-UP VISIT: CPT

## 2024-07-12 NOTE — PROGRESS NOTES
Doing well, no issues. Taking ASA.    Review of Systems  A complete review of systems was conducted, pertinent only to the HPI noted above.  Constitutional: None  Eyes: No additions to above history  Ears, Nose, Throat: No additions to above history  Cardiovascular: No additions to above history  Respiratory: No additions to above history  GI: No additions to above history  : No additions to above history  Skin/Neuro: No additions to above history  Endocrine/Heme/Lymph: No additions to above history  Immunologic: No additions to above history  Psychiatric: No additions to above history  Musculoskeletal: see above    Physical Exam  GEN: Alert and Oriented x 3  Constitutional: Well appearing, in no apparent distress.        Focused Musculoskeletal Exam:     RIGHT  shoulder:  portal incisions closed, no signs of infection, biceps fires, deltoid fires    Sensation intact Ax/median/ulnar/radial distributions  Motor intact Ax/median/radial/ulnar/AIN/PIN    Doing well.  Begin PT, follow up 4 weeks. All questions answered, patient in agreement with the plan.

## 2024-07-19 ENCOUNTER — EVALUATION (OUTPATIENT)
Dept: PHYSICAL THERAPY | Facility: CLINIC | Age: 59
End: 2024-07-19
Payer: COMMERCIAL

## 2024-07-19 DIAGNOSIS — M75.101 TEAR OF RIGHT ROTATOR CUFF, UNSPECIFIED TEAR EXTENT, UNSPECIFIED WHETHER TRAUMATIC: Primary | ICD-10-CM

## 2024-07-19 DIAGNOSIS — M25.511 RIGHT SHOULDER PAIN, UNSPECIFIED CHRONICITY: Primary | ICD-10-CM

## 2024-07-19 DIAGNOSIS — M62.81 MUSCLE WEAKNESS OF RIGHT UPPER EXTREMITY: ICD-10-CM

## 2024-07-19 DIAGNOSIS — M75.101 TEAR OF RIGHT ROTATOR CUFF, UNSPECIFIED TEAR EXTENT, UNSPECIFIED WHETHER TRAUMATIC: ICD-10-CM

## 2024-07-19 DIAGNOSIS — M25.611 DECREASED RANGE OF MOTION OF RIGHT SHOULDER: ICD-10-CM

## 2024-07-19 PROCEDURE — 97110 THERAPEUTIC EXERCISES: CPT | Mod: GP | Performed by: PHYSICAL THERAPIST

## 2024-07-19 PROCEDURE — 97161 PT EVAL LOW COMPLEX 20 MIN: CPT | Mod: GP | Performed by: PHYSICAL THERAPIST

## 2024-07-19 PROCEDURE — 97140 MANUAL THERAPY 1/> REGIONS: CPT | Mod: GP | Performed by: PHYSICAL THERAPIST

## 2024-07-19 ASSESSMENT — ENCOUNTER SYMPTOMS
OCCASIONAL FEELINGS OF UNSTEADINESS: 0
DEPRESSION: 0
LOSS OF SENSATION IN FEET: 0

## 2024-07-19 NOTE — PROGRESS NOTES
"Physical Therapy    Physical Therapy Evaluation and Treatment      Patient Name: Parvin Garibay \"Sergo"  MRN: [id number]  Today's Date: 7/19/2024  Time Entry:   Time Calculation  Start Time: 1110  Stop Time: 1200  Time Calculation (min): 50 min  PT Evaluation Time Entry  PT Evaluation (Low) Time Entry: 25  PT Therapeutic Procedures Time Entry  Manual Therapy Time Entry: 15  Therapeutic Exercise Time Entry: 10  Insurance: [place]  Authorization Needed  Visit #1    Assessment:  Diana is a 59 y.o. R hand dominant female presenting 2 1/2 weeks s/p R shoulder arthroscopic rotator cuff repair, debridement and biceps tenodesis on 7/1/24. Exam shows impaired shoulder PROM, AROM and strength. She is limited in all functional use of dominant arm for ADLs. Skilled PT is medically necessary to address these impairments and full functional use of her arm for ADLs.    Plan:  OP PT Plan  Treatment/Interventions: Cryotherapy, Education/ Instruction, Hot pack, Manual therapy, Therapeutic exercises, Vasopneumatic device  PT Plan: Skilled PT  PT Frequency:  (1-2x/week)  Duration: 12-16 weeks  Onset Date: 07/01/24  Number of Treatments Authorized: Needs authorization  Rehab Potential: Good  Plan of Care Agreement: Patient    Problem List Items Addressed This Visit             ICD-10-CM    Right shoulder pain - Primary M25.511    Relevant Orders    Follow Up In Physical Therapy    Tear of right rotator cuff M75.101    Decreased range of motion of right shoulder M25.611    Relevant Orders    Follow Up In Physical Therapy    Muscle weakness of right upper extremity M62.81    Relevant Orders    Follow Up In Physical Therapy     General:  Reason for Referral: s/p R shoulder arthroscopic rotator cuff repair, debridement and biceps tenodesis  Referred By: Dr. Omega Luo    Diana presents 2 1/2 weeks s/p R shoulder arthroscopic rotator cuff repair, debridement and biceps tenodesis on 7/1/24 following several years of intermittent pain " with worsening in the last 6 months. Since surgery she has been wearing her sling most of time except when sitting in chair. Her pain is well-controlled currently. She is no longer taking pain meds and is only icing when pain is present. She denies any radiating pain or n/t. She enjoys paddling and wants to start an exercise regime consisting of weight-training and yoga after she recovers from surgery. PMH: Hashimoto's thyroiditis, gout    Pt Goals: “Function like it did before.”    Precautions:  Precautions  STEADI Fall Risk Score (The score of 4 or more indicates an increased risk of falling): 0    Pain:  0/10 pain currently, 2/10 at worst achy    Prior Level of Function:  Independent in all activities.    Objective   Right shoulder PROM (degrees):   Flexion- 160  Abduction- 128  ER- 55  IR- 60    Shoulder AROM in degrees (L only secondary to post-op restrictions):   Flexion- 180  Abduction- 180  Extension- 60  Functional ER reach- T4  Functional IR reach- T8    Shoulder MMT (L only secondary to post-op restrictions):   Flexion- 5/5  Abduction- 5/5  ER- 5/5  IR- 5/5  Serratus anterior- 4+/5  Biceps- 5/5  Triceps- 5/5    Outcome Measures:  Quick DASH: 38 = 61.36%    Treatments:  Manual Therapy:  PROM R elbow in flex, ext, pron, sup  PROM R shoulder in flex, abd, ER, IR    Therapeutic Exercise:  Instructed in wrist and elbow AROM exercises  Seated scapular retractions x15  Pendulums (flex/ext, circles cw/ccw) x15 ea    EDUCATION:  Access Code: [email or web address]  URL: [email or web address]  Date: 07/19/2024  Prepared by: Devin Reyes    Exercises  - Seated Wrist Flexion AROM  - 3 x daily - 7 x weekly - 1 sets - 10-15 reps  - Seated Wrist Extension AROM  - 3 x daily - 7 x weekly - 1 sets - 10-15 reps  - Seated Forearm Pronation and Supination AROM  - 3 x daily - 7 x weekly - 1 sets - 10-15 reps  - Seated Scapular Retraction  - 3 x daily - 7 x weekly - 1 sets - 15-20 reps  - Flexion-Extension Shoulder  Pendulum with Table Support  - 3 x daily - 7 x weekly - 1 sets - 15-20 reps  - Circular Shoulder Pendulum with Table Support  - 3 x daily - 7 x weekly - 1 sets - 15-20 reps    Goals:  Active       PT Problem       PT Goal 1       Start:  07/19/24    Expected End:  11/16/24       Increase R shoulder AROM to at least 160 degrees flexion and abduction, 60 degrees extension, functional ER reach to T3 and functional IR reach to T10 to allow reaching overhead and behind back to bath and dress.         PT Goal 2       Start:  07/19/24    Expected End:  11/16/24       Increase R shoulder MMT to at least 4+/5 throughout to improve lifting, carrying, holding arm overhead and exercising.         PT Goal 3       Start:  07/19/24    Expected End:  11/16/24       Pt will report 0/10 R shoulder pain to improve bathing, showering, housework and exercising.         PT Goal 4       Start:  07/19/24    Expected End:  11/16/24       Improve Quick DASH by at least 15.91 points (MDIC).         PT Goal 5       Start:  07/19/24    Expected End:  11/16/24       Pt will demonstrate independence with HEP for proper self-management at home.

## 2024-07-22 DIAGNOSIS — F51.01 PRIMARY INSOMNIA: Primary | ICD-10-CM

## 2024-07-22 RX ORDER — TRAZODONE HYDROCHLORIDE 50 MG/1
TABLET ORAL
Qty: 90 TABLET | Refills: 3 | Status: SHIPPED | OUTPATIENT
Start: 2024-07-22

## 2024-07-26 ENCOUNTER — TREATMENT (OUTPATIENT)
Dept: PHYSICAL THERAPY | Facility: CLINIC | Age: 59
End: 2024-07-26
Payer: COMMERCIAL

## 2024-07-26 DIAGNOSIS — M25.511 RIGHT SHOULDER PAIN, UNSPECIFIED CHRONICITY: Primary | ICD-10-CM

## 2024-07-26 DIAGNOSIS — M25.611 DECREASED RANGE OF MOTION OF RIGHT SHOULDER: ICD-10-CM

## 2024-07-26 DIAGNOSIS — M62.81 MUSCLE WEAKNESS OF RIGHT UPPER EXTREMITY: ICD-10-CM

## 2024-07-26 PROCEDURE — 97016 VASOPNEUMATIC DEVICE THERAPY: CPT | Mod: GP | Performed by: PHYSICAL THERAPIST

## 2024-07-26 PROCEDURE — 97110 THERAPEUTIC EXERCISES: CPT | Mod: GP | Performed by: PHYSICAL THERAPIST

## 2024-07-26 PROCEDURE — 97140 MANUAL THERAPY 1/> REGIONS: CPT | Mod: GP | Performed by: PHYSICAL THERAPIST

## 2024-07-26 NOTE — PROGRESS NOTES
"Physical Therapy    Physical Therapy Treatment    Patient Name: Parvin Garibay  MRN: [id number]  Today's Date: 7/26/2024  Time Entry:   Time Calculation  Start Time: 1205  Stop Time: 1250  Time Calculation (min): 45 min     PT Therapeutic Procedures Time Entry  Manual Therapy Time Entry: 20  Therapeutic Exercise Time Entry: 10  PT Modalities Time Entry  Vasopneumatic Devices Time Entry: 10     Insurance: [place]  Authorization Needed  Approved 7/26/24 - 10/24/24  Visit #2/14    Assessment:  Biceps fatigues with elbow flexion AROM in supine. Performs pendulums properly without corrections.    Plan:  Cont PROM.    Problem List Items Addressed This Visit             ICD-10-CM    Right shoulder pain - Primary M25.511    Decreased range of motion of right shoulder M25.611    Muscle weakness of right upper extremity M62.81       Subjective    \"The only time it really bothers me is if I'm out of my sling and I try to reach too far. Otherwise it feels fine.\" Pt reports compliance with HEP.    Precautions: rotator cuff repair/ biceps tenodesis protocol    Pain  0/10    Objective   PROM flexion to ~ degrees prior to onset of pain    Treatments:  Manual Therapy:  PROM R elbow in flex, ext, pron, sup  PROM R shoulder in flex, abd, ER, IR     Therapeutic Exercise:  Supine elbow flex/ext x10 ea  Supine elbow pron/sup x10 ea  Seated scapular retractions x15  Pendulums (flex/ext, circles cw/ccw) x15 ea    Modalities:  Game Ready to R shoulder (34 deg, mod pressure) x10 mins    Goals:  Active       PT Problem       PT Goal 1       Start:  07/19/24    Expected End:  11/16/24       Increase R shoulder AROM to at least 160 degrees flexion and abduction, 60 degrees extension, functional ER reach to T3 and functional IR reach to T10 to allow reaching overhead and behind back to bath and dress.         PT Goal 2       Start:  07/19/24    Expected End:  11/16/24       Increase R shoulder MMT to at least 4+/5 throughout to improve " lifting, carrying, holding arm overhead and exercising.         PT Goal 3       Start:  07/19/24    Expected End:  11/16/24       Pt will report 0/10 R shoulder pain to improve bathing, showering, housework and exercising.         PT Goal 4       Start:  07/19/24    Expected End:  11/16/24       Improve Quick DASH by at least 15.91 points (MDIC).         PT Goal 5       Start:  07/19/24    Expected End:  11/16/24       Pt will demonstrate independence with HEP for proper self-management at home.

## 2024-07-27 DIAGNOSIS — M79.606 PAIN OF LOWER EXTREMITY, UNSPECIFIED LATERALITY: ICD-10-CM

## 2024-07-29 ENCOUNTER — TREATMENT (OUTPATIENT)
Dept: PHYSICAL THERAPY | Facility: CLINIC | Age: 59
End: 2024-07-29
Payer: COMMERCIAL

## 2024-07-29 DIAGNOSIS — M62.81 MUSCLE WEAKNESS OF RIGHT UPPER EXTREMITY: ICD-10-CM

## 2024-07-29 DIAGNOSIS — M25.511 RIGHT SHOULDER PAIN, UNSPECIFIED CHRONICITY: Primary | ICD-10-CM

## 2024-07-29 DIAGNOSIS — M25.611 DECREASED RANGE OF MOTION OF RIGHT SHOULDER: ICD-10-CM

## 2024-07-29 PROCEDURE — 97110 THERAPEUTIC EXERCISES: CPT | Mod: GP | Performed by: PHYSICAL THERAPIST

## 2024-07-29 PROCEDURE — 97140 MANUAL THERAPY 1/> REGIONS: CPT | Mod: GP | Performed by: PHYSICAL THERAPIST

## 2024-07-29 PROCEDURE — 97016 VASOPNEUMATIC DEVICE THERAPY: CPT | Mod: GP | Performed by: PHYSICAL THERAPIST

## 2024-07-29 RX ORDER — DICLOFENAC SODIUM 10 MG/G
GEL TOPICAL
Qty: 100 G | Refills: 2 | Status: SHIPPED | OUTPATIENT
Start: 2024-07-29

## 2024-07-29 NOTE — PROGRESS NOTES
"Physical Therapy    Physical Therapy Treatment    Patient Name: Parvin Garibay  MRN: [id number]  Today's Date: 7/29/2024  Time Entry:   Time Calculation  Start Time: 1230  Stop Time: 1310  Time Calculation (min): 40 min     PT Therapeutic Procedures Time Entry  Manual Therapy Time Entry: 15  Therapeutic Exercise Time Entry: 10  PT Modalities Time Entry  Vasopneumatic Devices Time Entry: 10     Insurance: [employer]  Authorization Needed  Approved 7/26/24 - 10/24/24  Visit #3/14    Assessment:  Continues to experience biceps soreness with active elbow flexion; abolished with cues to remain within 0-90 ROM.    Plan:  Cont PROM.  Begin AA flexion in supine, dowel ER and pulleys at 4 weeks post-op.    Problem List Items Addressed This Visit             ICD-10-CM    Right shoulder pain - Primary M25.511    Decreased range of motion of right shoulder M25.611    Muscle weakness of right upper extremity M62.81       Subjective    DOS: 7/1/24  \"No complaints, maybe a little stiffness.\"    Precautions: rotator cuff repair/ biceps tenodesis protocol    Pain  0/10    Objective   110 degrees PROM flexion.    Treatments:  Manual Therapy:  PROM R elbow in flex, ext, pron, sup  PROM R shoulder in flex, abd, ER, IR     Therapeutic Exercise:  Seated elbow flex/ext x10 ea  Seated pron/sup x10 ea  Seated scapular retractions x15  Seated scap depression x15  Pendulums (flex/ext, circles cw/ccw) x15 ea    Modalities:  Game Ready to R shoulder (34 deg, mod pressure) x10 mins    Goals:  Active       PT Problem       PT Goal 1       Start:  07/19/24    Expected End:  11/16/24       Increase R shoulder AROM to at least 160 degrees flexion and abduction, 60 degrees extension, functional ER reach to T3 and functional IR reach to T10 to allow reaching overhead and behind back to bath and dress.         PT Goal 2       Start:  07/19/24    Expected End:  11/16/24       Increase R shoulder MMT to at least 4+/5 throughout to improve lifting, carrying, " holding arm overhead and exercising.         PT Goal 3       Start:  07/19/24    Expected End:  11/16/24       Pt will report 0/10 R shoulder pain to improve bathing, showering, housework and exercising.         PT Goal 4       Start:  07/19/24    Expected End:  11/16/24       Improve Quick DASH by at least 15.91 points (MDIC).         PT Goal 5       Start:  07/19/24    Expected End:  11/16/24       Pt will demonstrate independence with HEP for proper self-management at home.

## 2024-08-05 ENCOUNTER — TREATMENT (OUTPATIENT)
Dept: PHYSICAL THERAPY | Facility: CLINIC | Age: 59
End: 2024-08-05
Payer: COMMERCIAL

## 2024-08-05 DIAGNOSIS — M25.611 DECREASED RANGE OF MOTION OF RIGHT SHOULDER: ICD-10-CM

## 2024-08-05 DIAGNOSIS — M62.81 MUSCLE WEAKNESS OF RIGHT UPPER EXTREMITY: ICD-10-CM

## 2024-08-05 DIAGNOSIS — M25.511 RIGHT SHOULDER PAIN, UNSPECIFIED CHRONICITY: ICD-10-CM

## 2024-08-05 PROCEDURE — 97140 MANUAL THERAPY 1/> REGIONS: CPT | Mod: GP,CQ | Performed by: PHYSICAL THERAPY ASSISTANT

## 2024-08-05 PROCEDURE — 97016 VASOPNEUMATIC DEVICE THERAPY: CPT | Mod: GP,CQ | Performed by: PHYSICAL THERAPY ASSISTANT

## 2024-08-05 PROCEDURE — 97110 THERAPEUTIC EXERCISES: CPT | Mod: GP,CQ | Performed by: PHYSICAL THERAPY ASSISTANT

## 2024-08-05 NOTE — PROGRESS NOTES
"Physical Therapy    Physical Therapy Treatment    Patient Name: Parvin Garibay  MRN: [id number]  Today's Date: 8/5/2024  Time Entry:   Time Calculation  Start Time: 0730  Stop Time: 0815  Time Calculation (min): 45 min     PT Therapeutic Procedures Time Entry  Manual Therapy Time Entry: 10  Therapeutic Exercise Time Entry: 20  PT Modalities Time Entry  Vasopneumatic Devices Time Entry: 10     Insurance: [employer]  Authorization Needed  Approved 7/26/24 - 10/24/24  Visit #4/14    Assessment:  Unable to tolerate AA wand flexion. Encouraged her to adhere to Surgeon's protocol and wait until we do active motions in PT.     Plan:  Cont PROM.  Begin AA flexion in supine, dowel ER and pulleys at 4 weeks post-op.    Problem List Items Addressed This Visit             ICD-10-CM    Right shoulder pain M25.511    Decreased range of motion of right shoulder M25.611    Muscle weakness of right upper extremity M62.81       Subjective    DOS: 7/1/24--5 weeks ost-op  \"I've been using it\". Demo's ER and slight add/ab . Compliant w/ HEP . Working from home on lap top w/ keyboard and mousing at 90 deg away from body.     Precautions: rotator cuff repair/ biceps tenodesis protocol    Pain  0/10    Objective   112 degrees PROM flexion.    Treatments:  Manual Therapy:  PROM R elbow in flex, ext, pron, sup  PROM R shoulder in flex, abd, ER, IR     Therapeutic Exercise:  Seated elbow flex/ext x10 ea  Seated pron/sup x10 ea  Seated scapular retractions x10  AA dowel ER x 20 reps    AA flexion in supine x 3 ; stopped d/t P!  AA flexion table slides 10 reps B UE   Seated scap depression x15  Pendulums (flex/ext, circles cw/ccw) x15 ea  Pulleys x 20 reps     Modalities:  Game Ready to R shoulder (34 deg, mod pressure) x10 mins    Goals:  Active       PT Problem       PT Goal 1       Start:  07/19/24    Expected End:  11/16/24       Increase R shoulder AROM to at least 160 degrees flexion and abduction, 60 degrees extension, functional ER reach to " T3 and functional IR reach to T10 to allow reaching overhead and behind back to bath and dress.         PT Goal 2       Start:  07/19/24    Expected End:  11/16/24       Increase R shoulder MMT to at least 4+/5 throughout to improve lifting, carrying, holding arm overhead and exercising.         PT Goal 3       Start:  07/19/24    Expected End:  11/16/24       Pt will report 0/10 R shoulder pain to improve bathing, showering, housework and exercising.         PT Goal 4       Start:  07/19/24    Expected End:  11/16/24       Improve Quick DASH by at least 15.91 points (MDIC).         PT Goal 5       Start:  07/19/24    Expected End:  11/16/24       Pt will demonstrate independence with HEP for proper self-management at home.

## 2024-08-09 ENCOUNTER — APPOINTMENT (OUTPATIENT)
Dept: ORTHOPEDIC SURGERY | Facility: CLINIC | Age: 59
End: 2024-08-09
Payer: COMMERCIAL

## 2024-08-09 ENCOUNTER — APPOINTMENT (OUTPATIENT)
Dept: PHYSICAL THERAPY | Facility: CLINIC | Age: 59
End: 2024-08-09
Payer: COMMERCIAL

## 2024-08-09 VITALS — HEIGHT: 62 IN | WEIGHT: 266 LBS | BODY MASS INDEX: 48.95 KG/M2

## 2024-08-09 DIAGNOSIS — Z48.89 AFTERCARE FOLLOWING SURGERY: Primary | ICD-10-CM

## 2024-08-09 PROCEDURE — 1036F TOBACCO NON-USER: CPT

## 2024-08-09 PROCEDURE — 99024 POSTOP FOLLOW-UP VISIT: CPT

## 2024-08-09 PROCEDURE — 3008F BODY MASS INDEX DOCD: CPT

## 2024-08-09 ASSESSMENT — PAIN - FUNCTIONAL ASSESSMENT: PAIN_FUNCTIONAL_ASSESSMENT: NO/DENIES PAIN

## 2024-08-09 NOTE — PROGRESS NOTES
Doing well, no issues. Has been attending PT.     Review of Systems  A complete review of systems was conducted, pertinent only to the HPI noted above.  Constitutional: None  Eyes: No additions to above history  Ears, Nose, Throat: No additions to above history  Cardiovascular: No additions to above history  Respiratory: No additions to above history  GI: No additions to above history  : No additions to above history  Skin/Neuro: No additions to above history  Endocrine/Heme/Lymph: No additions to above history  Immunologic: No additions to above history  Psychiatric: No additions to above history  Musculoskeletal: see above    Physical Exam  GEN: Alert and Oriented x 3  Constitutional: Well appearing, in no apparent distress.        Focused Musculoskeletal Exam:     RIGHT  shoulder:  portal incisions closed, no signs of infection, biceps fires, deltoid fires. PROM  ER 40    Sensation intact Ax/median/ulnar/radial distributions  Motor intact Ax/median/radial/ulnar/AIN/PIN    Doing well.  Continue PT, follow up 6 weeks. All questions answered, patient in agreement with the plan.

## 2024-08-15 ENCOUNTER — APPOINTMENT (OUTPATIENT)
Dept: PHYSICAL THERAPY | Facility: CLINIC | Age: 59
End: 2024-08-15
Payer: COMMERCIAL

## 2024-08-19 ENCOUNTER — APPOINTMENT (OUTPATIENT)
Dept: PHYSICAL THERAPY | Facility: CLINIC | Age: 59
End: 2024-08-19
Payer: COMMERCIAL

## 2024-08-19 NOTE — PROGRESS NOTES
"   Patient: Parvin Garibay    [id number]  : 1965 -- AGE 59 y.o.    Provider: Nabeel Edwards MD PhD     Location [facility]   Service Date: 2024              [facility]  Followup Visit Note      HISTORY OF PRESENT ILLNESS     The patient's referring provider is: Mayela Gupta MD     REASON FOR VISIT:   Chief Complaint   Patient presents with    Follow-up        HISTORY OF PRESENT ILLNESS   Ms. Parvin Garibay \"Sergo" is a 59 y.o. female with past medical history of Sjogren's, gout, HLD, HTN, hypothyroid, fibromyalgia who presents to a [facility] for a sleep medicine evaluation with concerns of sleep apnea.     PAST SLEEP HISTORY  Patient with history of snoring and choking/gasping episodes. She had a sleep study in  but we do not have results, but there is some mention in clinic notes. There may have been a PSG on 14 that showed moderate WHITNEY with an AHI of 26.6/h. A CPAP study was done on 5/20/15 but patient only seemed to use CPAP for a brief period and study was terminated. She takes trazodone 25mg to help with sleep.    She had a diagnostic sleep study in 2/10/2024 and a BMI of 47.6.  She slept for 295 minutes with a sleep efficiency of 74%.  The RDI 3% was 27 events/hour and an RDI 4% was 15 events/hour.  The time below oxygen saturation of 88% was 5.5 minutes.  The SpO2 marco antonio was 84%.  PLM's at a rate of 19.8 events/hour are noted, PAP therapy at 5-15 cm of water was recommended.    DME: [facility]    CURRENT HISTORY  PAP use was recommended and she had been adherent and saw our NORA Sugar Solano in 2024 and appeared to be doing well with some improvements in insomnia symptoms as well    On today's visit, she indicates that she is doing well from the standpoint of her sleep apnea.  She has been using CPAP almost every night.  She is using nasal pillows.  She finds this to be very comfortable for her.  On CPAP does not " snore, gasp, nor choke according her partner.  She is better able to sleep through the night.  She is able to fall asleep faster.  She does use trazodone still but is looking to potentially wean off of that.  During the day she feels much more alert.    Sleep schedule:  In bed: 9-10pm  Activities in bed:   Bedtime Activities: watch TV; sometimes read  Subjective sleep latency:  15 min  Awakenings during night: 0-1x BR  Length of awakenings: < 15 min  Final awakening time: 6AM (sometimes 3-4AM) - wakes on own  Out of bed: will get up when wakes up  Overall estimate of total sleep time: 6-7h    Weekends: similar  Preferred sleeping position: prone  Typically sleeps  with her wife .       Daytime Symptoms:  On awakening patient reports: no morning symptoms and waking refreshed    Daytime: Feels more alert. She is an ..    Driving History: she has a 30m commute from home to work. The patient typically drives to work.  She is not a . With driving, the patient denies sleepy driving, with 0 sleepiness-related motor vehicle accidents and 0 near misses.      ESS: 3  MARTY: 0  FOSQ:  40      REVIEW OF SYSTEMS     REVIEW OF SYSTEMS  Review of Systems   All other systems reviewed and are negative.      ALLERGIES AND MEDICATIONS     ALLERGIES  Allergies   Allergen Reactions    Adhesive Tape-Silicones Rash    Penicillins Rash    Rosuvastatin Other     Flu like symptoms    Simvastatin Other     Flu like symptoms    Statins-Hmg-Coa Reductase Inhibitors Other     Flu like symptoms    Sulfa (Sulfonamide Antibiotics) Rash    Sulfadiazine Rash       MEDICATIONS  Current Outpatient Medications   Medication Sig Dispense Refill    allopurinol (Zyloprim) 300 mg tablet TAKE 1 TABLET BY MOUTH EVERY DAY 90 tablet 3    diclofenac sodium (Voltaren) 1 % gel APPLY A SMALL AMOUNT TO THE PAINFUL AREA 3 TIMES A DAY AS NEEDED 100 g 2    DULoxetine (Cymbalta) 20 mg DR capsule Take 1 capsule (20 mg) by mouth once daily. Do  not crush or chew. 90 capsule 1    ketoconazole (NIZOral) 2 % cream Apply topically 2 times a day. 60 g 3    levothyroxine (Synthroid, Levoxyl) 125 mcg tablet TAKE 1 TABLET BY MOUTH EVERY DAY 90 tablet 3    lifitegrast (Xiidra) 5 % dropperette Administer 1 drop into affected eye(s) once daily. 60 each 11    traZODone (Desyrel) 50 mg tablet 1/2-1 po hs prn insomnia 90 tablet 3    omeprazole (PriLOSEC) 20 mg DR capsule Take 1 capsule (20 mg) by mouth once daily in the morning. Take before meals for 5 days. Do not crush or chew. 5 capsule 0     No current facility-administered medications for this visit.         PAST HISTORY     PAST MEDICAL HISTORY  She  has a past medical history of Acute sinusitis (01/15/2024), Adenomatous polyp of colon (07/12/2023), Allergic rhinitis (2010), Ankle pain (01/15/2024), Autoimmune thyroiditis (08/11/2014), Bilateral presbyopia (07/12/2023), Casts urinary hyaline (07/12/2023), Dysthymic disorder (08/06/2014), Encounter for other screening for malignant neoplasm of breast (10/22/2020), History of metabolic disorder (01/15/2024), Hypothyroidism, Morbid obesity (Multi), Nontraumatic complete tear of right rotator cuff (01/15/2024), Nontraumatic tear of rotator cuff (01/15/2024), WHITNEY (obstructive sleep apnea), Other conditions influencing health status (06/18/2015), Personal history of other diseases of the respiratory system, Personal history of other endocrine, nutritional and metabolic disease (06/18/2015), Personal history of other endocrine, nutritional and metabolic disease, Personal history of other endocrine, nutritional and metabolic disease, Right knee pain (07/12/2023), Snoring, and Tendinitis of right rotator cuff (07/12/2023).      PAST SURGICAL HISTORY:  Past Surgical History:   Procedure Laterality Date    COLONOSCOPY      ORTHODONTIC TREATMENT  1979    OTHER SURGICAL HISTORY  04/05/2019    No history of surgery       FAMILY HISTORY  Family History   Problem Relation Name  "Age of Onset    Insomnia Father Kervin     Snoring Father Kervin     Diabetes Father Kervin     Hyperlipidemia Father Kervin     Hypertension Mother Yarely     Stroke Maternal Grandmother Lindsey     Diabetes Paternal Grandfather William     COPD Paternal Grandmother Kay      She does not have a family history of sleep disorder (e.g., sleep apnea, narcolepsy in any first degree relatives). Father was a loud snorer but never tested      SOCIAL HISTORY  She  reports that she quit smoking about 13 years ago. Her smoking use included cigarettes. She started smoking about 29 years ago. She has a 10 pack-year smoking history. She has never used smokeless tobacco. She reports that she does not drink alcohol and does not use drugs. She currently lives with a partner. She works as an .    Caffeine consumption: Yesm- 2/day  Alcohol consumption: No  Smoking: No - quit 2013  Marijuana: No      PHYSICAL EXAM     Physical Examination: /84 (BP Location: Right arm, Patient Position: Sitting, BP Cuff Size: Adult)   Pulse 75   Temp 36.6 °C (97.9 °F) (Temporal)   Ht 1.6 m (5' 3\")   Wt 125 kg (276 lb)   SpO2 96%   BMI 48.89 kg/m²     PREVIOUS WEIGHTS:  Wt Readings from Last 3 Encounters:   08/21/24 125 kg (276 lb)   08/09/24 121 kg (266 lb)   07/01/24 121 kg (266 lb 12.1 oz)       General: The patient is a pleasant female, in no acute distress. HEENT: She has a  a modified Mallampati grade 3 airway with Numbers; 0-4 (with +): 1+ tonsils bilaterally. The soft palate was normal and the uvula was normal. The A/P diameter of the velopharynx was narrowed. The oropharynx was not shallow in the A/P diameter. Lateral wall narrowing was present. Tongue ridging was notpresent. Erythema of the posterior pharynx was not present. Mucosal hypertrophy in the posterior oropharynx was not present. Retrognathia was not and micrognathia was not present. Neck: The neck was not enlarged. No JVP, bruits or lymphadenopathy was " "appreciated. Chest: Clear to auscultation. No wheezes, rales, or rhonchi. Cardiovascular: Regular rate and rhythm. No murmurs, gallops, or clicks. Abdomen: Soft, nontender, nondistended. Positive bowel sounds. Extremities: No clubbing, cyanosis, or edema is noted. Neurologic exam: Alert, oriented x3 and was grossly non-focal.    RESULTS/DATA     No results found for: \"IRON\", \"TRANSFERRIN\", \"IRONSAT\", \"TIBC\", \"FERRITIN\"    Bicarbonate (mmol/L)   Date Value   06/17/2024 28   09/23/2023 29   06/20/2022 30   01/04/2022 31     PAP Data Download        DIAGNOSES     Problem List and Orders  Diagnoses and all orders for this visit:  Morbid obesity (Multi)  Insomnia, unspecified type  WHITNEY (obstructive sleep apnea)  -     Follow Up In Adult Sleep Medicine  -     Follow Up In Adult Sleep Medicine; Future          ASSESSMENT/PLAN     Ms. Garibay is a 59 y.o. female and with past medical history of Sjogren's, gout, HLD, HTN, hypothyroid, fibromyalgia She presents to  the [facility] to address her tiredness.    WHITNEY on PAP: Overall, Ms. Garibay is doing well with the use of PAP therapy for her WHITNEY. Compared to before starting PAP, she continues to use and benefit from use of the PAP device and has observed improvements in sleep quality and daytime function. Specifically, her residual daytime sleepiness or fatigue has significantly improved. Thus, continued use of PAP was recommended and to use for the entire sleep period. On PAP, she doesn't snore, have witnessed apneas, gasp for air, or kick/thrash. Objective compliance data also suggest excellent efficacy in the home setting. At a minimum she should use PAP for at least 4 hours/night, however, \"all night, every night\" is best. In addition, PAP use during naps are also encouraged. Ms. Garibay was advised to contact her DME to obtain replenishment supplies for her PAP every 3 months or as needed or for other equipment specific issues.      Chronic " insomnia.  She has had issues with difficulties falling and staying asleep.  This has improved since starting CPAP for her WHITNEY.  She is still using trazodone to 25 mg but may look to decrease it or stop it altogether.  I recommended that she could try half of her current dose which is 1/4 tablet for 1 week and then stop it completely when she is ready.      Obesity.  The patient was counseled that her weight is the strongest modifiable risk factor and contributor for WHITNEY. She was counseled to consider weight loss options to include changes in dietary habits and activity. Before initiating an exercise plan, she should carefully review the approach with her primary care provider.       Follow up: 12 months         Nabeel Edwards MD PhD

## 2024-08-21 ENCOUNTER — APPOINTMENT (OUTPATIENT)
Dept: SLEEP MEDICINE | Facility: CLINIC | Age: 59
End: 2024-08-21
Payer: COMMERCIAL

## 2024-08-21 VITALS
OXYGEN SATURATION: 96 % | DIASTOLIC BLOOD PRESSURE: 84 MMHG | SYSTOLIC BLOOD PRESSURE: 129 MMHG | BODY MASS INDEX: 48.9 KG/M2 | TEMPERATURE: 97.9 F | HEIGHT: 63 IN | HEART RATE: 75 BPM | WEIGHT: 276 LBS

## 2024-08-21 DIAGNOSIS — G47.00 INSOMNIA, UNSPECIFIED TYPE: ICD-10-CM

## 2024-08-21 DIAGNOSIS — G47.33 OSA (OBSTRUCTIVE SLEEP APNEA): ICD-10-CM

## 2024-08-21 DIAGNOSIS — E66.01 MORBID OBESITY (MULTI): Primary | ICD-10-CM

## 2024-08-21 PROCEDURE — 3074F SYST BP LT 130 MM HG: CPT | Performed by: INTERNAL MEDICINE

## 2024-08-21 PROCEDURE — 1036F TOBACCO NON-USER: CPT | Performed by: INTERNAL MEDICINE

## 2024-08-21 PROCEDURE — 3008F BODY MASS INDEX DOCD: CPT | Performed by: INTERNAL MEDICINE

## 2024-08-21 PROCEDURE — 99213 OFFICE O/P EST LOW 20 MIN: CPT | Performed by: INTERNAL MEDICINE

## 2024-08-21 PROCEDURE — 3079F DIAST BP 80-89 MM HG: CPT | Performed by: INTERNAL MEDICINE

## 2024-08-21 PROCEDURE — G2211 COMPLEX E/M VISIT ADD ON: HCPCS | Performed by: INTERNAL MEDICINE

## 2024-08-21 NOTE — PATIENT INSTRUCTIONS
[facility] Sleep Medicine  [address]  [facility]  [address]  [facility] [address]    [facility] [place]  [address]  [facility] [id number] [phone]  [facility]  [address]  [address]  [facility] [address]           NAME: Parvin Garibay   DATE: 8/21/2024     Your Sleep Provider Today: Nabeel Edwards MD PhD  Your Primary Care Physician: Mayela Gupta MD   Your Referring Provider: Nabeel Edwards MD PhD    Thank you for coming to the Sleep Medicine Clinic today! Your sleep medicine provider today was: Nabeel Edwards MD PhD Below is a summary of your treatment plan, other important information, and our contact numbers:  If you need to schedule an appointment, please call [phone]  If you need general assistance (e.g. forms completed, general questions), please call my , Sole, at [phone].  If you have a medical question about your sleep issues, please contact our nurses, Lissett or Krystle at [phone].   You can also contact us through [employer].      DIAGNOSIS:   1. WHITNEY (obstructive sleep apnea)  Follow Up In Adult Sleep Medicine              TREATMENT PLAN     Instructions - Common WHITNEY Recs: - For your sleep apnea, continue to use your PAP every night and use it whenever you are sleeping.   - Avoid alcohol or sedatives several hours prior to sleeping.   - Get additional supplies for your PAP (e.g., mask, hose, filters) every 3 months or as your insurance allows from your [employer] company. Replacement cushions for your PAP mask can be requested monthly if airseals are an issue.  - Remember to clean your mask, tubings, and water chamber regularly as instructed.  - Avoid driving or operating heavy machinery when drowsy. A person driving while sleepy is five (5) times more likely to have an accident. If you feel sleepy, pull over and take a short power nap (sleep  for less than 30 minutes). Otherwise, ask somebody to drive you.    Follow-up Appointment:   Followup with me in 12 months.      IMPORTANT INFORMATION     Call 911 for medical emergencies.  Our offices are generally open from Monday-Friday, 9 am - 5 pm.  If you need to get in touch with me, you may either call me and my team(number is below) or you can use MC10.  If a referral for a test, for CPAP, or for another specialist was made, and you have not heard about scheduling this within a week, please call scheduling at [phone]).  If you are unable to make your appointment for clinic or an overnight study, kindly call the office at least 48 hours in advance to cancel and reschedule.  If you are on CPAP, please bring your device's card or the device to each clinic appointment.   There are no supporting services by either the sleep doctors or their staff on weekends and Holidays, or after 5 PM on weekdays.   If you have been asked to come to a sleep study, make sure you bring toiletries, a comfy pillow, and any nighttime medications that you may regularly take. Also be sure to eat dinner before you arrive. We generally do not provide meals.      PRESCRIPTIONS     We require 7 days advanced notice for prescription refills. If we do not receive the request in this time, we cannot guarantee that your medication will be refilled in time.      IMPORTANT PHONE NUMBERS      scheduling for medical testin970 - [phone]   Sleep Medicine Clinic Fax: [phone]  Appointments (for Pediatric Sleep Clinic): [phone] - option 1  Appointments (for Adult Sleep Clinic): [phone] - option 2  Appointments (For Sleep Studies): [phone] - option 3  Behavioral Sleep Medicine: [phone]  Bariatric Surgery: [phone] ( Bariatric Surgery Website)   Sleep Surgery: [phone]  ENT (Otolaryngology): [phone]  Myofunctional Therapy (ENT): IRVIN Galindo, Samanta De Leon,  Tyler Cotto; [phone]   Lena Simeon; [phone]  Tonia Louis; [phone]  [facility]; [phone]  Danay Sales/Carmella  [phone] (option 1)  Headache Clinic (Neurology): [phone]  Neurology: [phone]  Psychiatry: [phone]  Pulmonary Function Testing (PFT) Center: [phone]  Pulmonary Medicine: [phone]  [employer] (DME): [phone]  [email or web address] (DME): [phone]  [facility] (DME): [phone]      COMMON PROVIDERS WE REFER TO     For Weight Loss - Dr. Gladys Blevins - Call [phone]  For Sleep Surgery - Dr. Ravinder Flaherty - Call [phone]      OUR ADULT SLEEP MEDICINE TEAM   Please do not hesitate to call the office or sleep nurse with any questions between appointments:    Adult Sleep Nurses (Krystle Lam, RN and Lissett Putnam RN):  For clinical questions and refilling prescriptions: [phone]  Email sleep diaries and other documents at: [email or web address]    Adult Sleep Medicine Secretaries:  Anna Abreu (For Kole/Lr/Krise/Strohl/Yeh/Lane):   P: [phone]  F: [phone]  Sole Good (For Edwards/Guggenbiller): P: [phone]  Fax: [phone]  Rebeca Robb (For Jurcevic/Blank): P: [phone]  F: [phone]  Gabbi Lazo (For Cami): P: [phone]  F: [phone]  Lucy Alfredo (For Elsa/Юлия/Zakhary): P: [phone]  F: [phone]  Deann Tate (For Gandhi/Solano): P: [phone]  F: [phone]     Adult Sleep Medicine Advanced Practice Providers:  Raleigh Anthony ([place], [place])  Laurita Redman ([facility])  Shikha Olivo CNP ([place], [place], [facility])  Greer Middleton CNP ([place], [place], [facility])  Leticia Almanza ([place], [place], [facility])  Festus Solano CNP ([facility])        OUR SLEEP TESTING LOCATIONS     Our team will contact you to schedule your sleep study, however, you can contact  "us as follow:  Main Phone Line (scheduling only): [phone]), option 3  Adult and Pediatric Locations   [place] (6 years and older): [facility] - 4th floor ([address] [facility]) After hours line: [phone]  [facility] (Main campus: All ages): [facility], 6th floor. After hours line: [phone]   [place] (5 years and older; younger considered on case-by-case basis): [address]; [address], [address]. Scheduling  After hours line: [phone]   [place] (6 years and older): [address]; [address]; [address]   [place] (6 years and older): [address] [facility], [address]  After hours line: [phone]   [place] (13 years and older) in [place]: [address], 2nd floor  After hours line: [phone]   [place] (13 year and older): [address]  After hours line: [phone]     Adult Only Locations:   [place] (18 years and older): [address] [facility], 2nd floor   [place] (18 years and older): [address] [facility]; 4th floor  After hours line: [phone]   [place] (18 years and older) at [place]: [address] [place] [address]  After hours line: [phone]          CONTACTING YOUR SLEEP MEDICINE PROVIDER     Send a message directly to your provider through \"My Chart\", which is the email service through your  Records Account: [email or web address]   Call [phone] and leave a message. One of the administrative assistants will forward the message to your sleep medicine provider through \"My Chart\" and/or email.     Your sleep medicine provider for this visit was: Nabeel Edwards MD PhD        "

## 2024-08-23 ENCOUNTER — TREATMENT (OUTPATIENT)
Dept: PHYSICAL THERAPY | Facility: CLINIC | Age: 59
End: 2024-08-23
Payer: COMMERCIAL

## 2024-08-23 DIAGNOSIS — M25.511 RIGHT SHOULDER PAIN, UNSPECIFIED CHRONICITY: Primary | ICD-10-CM

## 2024-08-23 DIAGNOSIS — M25.611 DECREASED RANGE OF MOTION OF RIGHT SHOULDER: ICD-10-CM

## 2024-08-23 DIAGNOSIS — M62.81 MUSCLE WEAKNESS OF RIGHT UPPER EXTREMITY: ICD-10-CM

## 2024-08-23 PROCEDURE — 97110 THERAPEUTIC EXERCISES: CPT | Mod: GP | Performed by: PHYSICAL THERAPIST

## 2024-08-23 NOTE — PROGRESS NOTES
"Physical Therapy    Physical Therapy Treatment    Patient Name: Parvin Garibay  MRN: [id number]  Today's Date: 8/23/2024  Time Entry:   Time Calculation  Start Time: 0730  Stop Time: 0815  Time Calculation (min): 45 min     PT Therapeutic Procedures Time Entry  Therapeutic Exercise Time Entry: 40  Insurance: [employer]  Authorization Needed  Approved 7/26/24 - 10/24/24  Visit #5/14    Assessment:  Able to progress AAROM exercises today and add submax isometrics without pain.    Plan:  Cont progression of AAROM and AROM exercises.    Problem List Items Addressed This Visit             ICD-10-CM    Right shoulder pain - Primary M25.511    Decreased range of motion of right shoulder M25.611    Muscle weakness of right upper extremity M62.81       Subjective    DOS: 7/1/24  \"I have good ROM. Still difficulty with putting my arm in a sleeve, reaching out while holding a cup of water and carrying weighted objects.\"    Precautions: rotator cuff repair/ biceps tenodesis protocol    Pain  0/10    Objective   170 degrees flexion AROM.    Treatments:   Therapeutic Exercise:  UBE x 5 mins  Pulleys in flexion 2x10  Wall submax isometrics: flex, ext, ER, IR x10 w/ 3-5 sec hold ea  Supine AA dowel press 2x10  Supine AA dowel flexion 2x10  Supine AA dowel ER 2x10  Seated table flexion and scaption slides 2x10 ea R    OP EDUCATION  Access Code: [email or web address]  URL: [email or web address]  Date: 08/23/2024  Prepared by: Devin Reyes    Exercises  - Supine Shoulder Press with Dowel  - 1-2 x daily - 7 x weekly - 2 sets - 10 reps  - Supine Shoulder Flexion with Dowel  - 1-2 x daily - 7 x weekly - 2 sets - 10 reps  - Supine Shoulder External Rotation AAROM with Dowel  - 1-2 x daily - 7 x weekly - 2 sets - 10 reps  - Seated Shoulder Flexion Towel Slide at Table Top  - 1-2 x daily - 7 x weekly - 2 sets - 10 reps  - Seated Shoulder Scaption Slide at Table Top with Forearm in Neutral  - 1-2 x daily - 7 x weekly - 2 sets - 10 " reps  - Isometric Shoulder Flexion with Ball at Wall  - 1-2 x daily - 7 x weekly - 1 sets - 10 reps - 3-5 seconds hold  - Isometric Shoulder Extension with Ball at Wall  - 1-2 x daily - 7 x weekly - 1 sets - 10 reps - 3-5 seconds hold  - Isometric Shoulder External Rotation with Ball at Wall  - 1-2 x daily - 7 x weekly - 1 sets - 10 reps - 3-5 seconds hold  - Standing Isometric Shoulder Internal Rotation at Wall with Ball  - 1-2 x daily - 7 x weekly - 1 sets - 10 reps - 3-5 seconds hold    Goals:  Active       PT Problem       PT Goal 1       Start:  07/19/24    Expected End:  11/16/24       Increase R shoulder AROM to at least 160 degrees flexion and abduction, 60 degrees extension, functional ER reach to T3 and functional IR reach to T10 to allow reaching overhead and behind back to bath and dress.         PT Goal 2       Start:  07/19/24    Expected End:  11/16/24       Increase R shoulder MMT to at least 4+/5 throughout to improve lifting, carrying, holding arm overhead and exercising.         PT Goal 3       Start:  07/19/24    Expected End:  11/16/24       Pt will report 0/10 R shoulder pain to improve bathing, showering, housework and exercising.         PT Goal 4       Start:  07/19/24    Expected End:  11/16/24       Improve Quick DASH by at least 15.91 points (MDIC).         PT Goal 5       Start:  07/19/24    Expected End:  11/16/24       Pt will demonstrate independence with HEP for proper self-management at home.

## 2024-08-26 ENCOUNTER — APPOINTMENT (OUTPATIENT)
Dept: PHYSICAL THERAPY | Facility: CLINIC | Age: 59
End: 2024-08-26
Payer: COMMERCIAL

## 2024-08-30 ENCOUNTER — TREATMENT (OUTPATIENT)
Dept: PHYSICAL THERAPY | Facility: CLINIC | Age: 59
End: 2024-08-30
Payer: COMMERCIAL

## 2024-08-30 DIAGNOSIS — M25.611 DECREASED RANGE OF MOTION OF RIGHT SHOULDER: ICD-10-CM

## 2024-08-30 DIAGNOSIS — M25.511 RIGHT SHOULDER PAIN, UNSPECIFIED CHRONICITY: Primary | ICD-10-CM

## 2024-08-30 DIAGNOSIS — M62.81 MUSCLE WEAKNESS OF RIGHT UPPER EXTREMITY: ICD-10-CM

## 2024-08-30 PROCEDURE — 97110 THERAPEUTIC EXERCISES: CPT | Mod: GP | Performed by: PHYSICAL THERAPIST

## 2024-08-30 NOTE — PROGRESS NOTES
"Physical Therapy    Physical Therapy Treatment    Patient Name: Parvin Garibay  MRN: [id number]  Today's Date: 8/30/2024  Time Entry:   Time Calculation  Start Time: 0730  Stop Time: 0815  Time Calculation (min): 45 min     PT Therapeutic Procedures Time Entry  Therapeutic Exercise Time Entry: 40  Insurance: [employer]  Authorization Needed  Approved 7/26/24 - 10/24/24  Visit #6/14    Assessment:  Progresses to gravity-resisted AROM exercises without pain or movement impairments.    Plan:  Band rows, isometric ABD, prone mid and low trap raises.    Problem List Items Addressed This Visit             ICD-10-CM    Right shoulder pain - Primary M25.511    Decreased range of motion of right shoulder M25.611    Muscle weakness of right upper extremity M62.81       Subjective    DOS: 7/1/24  \"Shoulder felt good after last session. It aches sometimes in the evening but I use salonpas and that helps.\"    Precautions: rotator cuff repair/ biceps tenodesis protocol    Pain  0/10    Objective   160 degrees abduction AROM.    Treatments:   Therapeutic Exercise:  UBE x 5 mins  Pulleys in flexion 2x10  Wall submax isometrics: flex, ext, ER, IR x10 w/ 3-5 sec hold ea  Seated table flexion and scaption slides 2x10 ea R  Supine AA dowel press 2x10  Supine AA dowel flexion 2x10  Supine AA dowel ER 2x10  Supine serratus press 2x10 w/ 5 sec hold  S/L ER 2x10  AA wall wash flexion 2x10    OP EDUCATION  Added AA wall wash to HEP.    Goals:  Active       PT Problem       PT Goal 1       Start:  07/19/24    Expected End:  11/16/24       Increase R shoulder AROM to at least 160 degrees flexion and abduction, 60 degrees extension, functional ER reach to T3 and functional IR reach to T10 to allow reaching overhead and behind back to bath and dress.         PT Goal 2       Start:  07/19/24    Expected End:  11/16/24       Increase R shoulder MMT to at least 4+/5 throughout to improve lifting, carrying, holding arm overhead and exercising.         " PT Goal 3       Start:  07/19/24    Expected End:  11/16/24       Pt will report 0/10 R shoulder pain to improve bathing, showering, housework and exercising.         PT Goal 4       Start:  07/19/24    Expected End:  11/16/24       Improve Quick DASH by at least 15.91 points (MDIC).         PT Goal 5       Start:  07/19/24    Expected End:  11/16/24       Pt will demonstrate independence with HEP for proper self-management at home.

## 2024-09-06 ENCOUNTER — DOCUMENTATION (OUTPATIENT)
Dept: PHYSICAL THERAPY | Facility: CLINIC | Age: 59
End: 2024-09-06
Payer: COMMERCIAL

## 2024-09-06 ENCOUNTER — APPOINTMENT (OUTPATIENT)
Dept: PHYSICAL THERAPY | Facility: CLINIC | Age: 59
End: 2024-09-06
Payer: COMMERCIAL

## 2024-09-06 NOTE — PROGRESS NOTES
"Physical Therapy                 Therapy Communication Note    Patient Name: Parvin Garibay \"Diana\"  MRN: [id number]  Today's Date: 9/6/2024     Discipline: Physical Therapy    Missed Visit Reason:      Missed Time: Cancel    Comment:  "

## 2024-09-20 ENCOUNTER — APPOINTMENT (OUTPATIENT)
Dept: ORTHOPEDIC SURGERY | Facility: CLINIC | Age: 59
End: 2024-09-20
Payer: COMMERCIAL

## 2024-09-27 ENCOUNTER — DOCUMENTATION (OUTPATIENT)
Dept: PHYSICAL THERAPY | Facility: CLINIC | Age: 59
End: 2024-09-27
Payer: COMMERCIAL

## 2024-09-27 NOTE — PROGRESS NOTES
"Physical Therapy                 Therapy Communication Note    Patient Name: Parvin Garibay \"Diana\"  MRN: [id number]  Department:   Room: Room/bed info not found  Today's Date: 9/27/2024     Discipline: Physical Therapy    Missed Visit Reason:      Missed Time: No Show    Comment:  "

## 2024-10-07 ENCOUNTER — APPOINTMENT (OUTPATIENT)
Dept: PHYSICAL THERAPY | Facility: CLINIC | Age: 59
End: 2024-10-07
Payer: COMMERCIAL

## 2024-10-28 ENCOUNTER — APPOINTMENT (OUTPATIENT)
Dept: PHYSICAL THERAPY | Facility: CLINIC | Age: 59
End: 2024-10-28
Payer: COMMERCIAL

## 2024-11-06 DIAGNOSIS — M10.9 GOUT, UNSPECIFIED: ICD-10-CM

## 2024-11-06 RX ORDER — ALLOPURINOL 300 MG/1
300 TABLET ORAL DAILY
Qty: 90 TABLET | Refills: 3 | Status: SHIPPED | OUTPATIENT
Start: 2024-11-06

## 2024-12-11 DIAGNOSIS — Z12.11 COLON CANCER SCREENING: Primary | ICD-10-CM

## 2024-12-23 DIAGNOSIS — B37.9 CANDIDIASIS: ICD-10-CM

## 2024-12-23 RX ORDER — KETOCONAZOLE 20 MG/G
CREAM TOPICAL 2 TIMES DAILY
Qty: 60 G | Refills: 3 | Status: SHIPPED | OUTPATIENT
Start: 2024-12-23 | End: 2025-12-23

## 2024-12-28 DIAGNOSIS — M79.606 PAIN OF LOWER EXTREMITY, UNSPECIFIED LATERALITY: ICD-10-CM

## 2024-12-31 RX ORDER — DULOXETIN HYDROCHLORIDE 20 MG/1
20 CAPSULE, DELAYED RELEASE ORAL DAILY
Qty: 90 CAPSULE | Refills: 3 | Status: SHIPPED | OUTPATIENT
Start: 2024-12-31 | End: 2025-06-29

## 2025-01-10 DIAGNOSIS — Z12.11 COLON CANCER SCREENING: Primary | ICD-10-CM

## 2025-01-10 RX ORDER — POLYETHYLENE GLYCOL 3350, SODIUM CHLORIDE, SODIUM BICARBONATE, POTASSIUM CHLORIDE 420; 11.2; 5.72; 1.48 G/4L; G/4L; G/4L; G/4L
4000 POWDER, FOR SOLUTION ORAL ONCE
Qty: 4000 ML | Refills: 0 | Status: SHIPPED | OUTPATIENT
Start: 2025-01-10 | End: 2025-01-10

## 2025-02-11 DIAGNOSIS — M1A.0790 IDIOPATHIC CHRONIC GOUT OF ANKLE WITHOUT TOPHUS, UNSPECIFIED LATERALITY: ICD-10-CM

## 2025-02-11 RX ORDER — CELECOXIB 200 MG/1
200 CAPSULE ORAL DAILY PRN
Qty: 90 CAPSULE | Refills: 1 | Status: SHIPPED | OUTPATIENT
Start: 2025-02-11

## 2025-02-14 ENCOUNTER — APPOINTMENT (OUTPATIENT)
Dept: OPHTHALMOLOGY | Facility: CLINIC | Age: 60
End: 2025-02-14
Payer: COMMERCIAL

## 2025-03-26 ENCOUNTER — OFFICE VISIT (OUTPATIENT)
Dept: OPHTHALMOLOGY | Facility: CLINIC | Age: 60
End: 2025-03-26
Payer: COMMERCIAL

## 2025-03-26 DIAGNOSIS — H04.123 DRY EYES: ICD-10-CM

## 2025-03-26 DIAGNOSIS — H01.02A SQUAMOUS BLEPHARITIS OF UPPER AND LOWER EYELIDS OF BOTH EYES: ICD-10-CM

## 2025-03-26 DIAGNOSIS — H52.7 REFRACTIVE ERROR: ICD-10-CM

## 2025-03-26 DIAGNOSIS — H01.02B SQUAMOUS BLEPHARITIS OF UPPER AND LOWER EYELIDS OF BOTH EYES: ICD-10-CM

## 2025-03-26 DIAGNOSIS — H25.813 COMBINED FORMS OF AGE-RELATED CATARACT OF BOTH EYES: Primary | ICD-10-CM

## 2025-03-26 PROBLEM — R73.09 ELEVATED HEMOGLOBIN A1C: Status: RESOLVED | Noted: 2023-07-12 | Resolved: 2025-03-26

## 2025-03-26 PROBLEM — R73.09 ELEVATED GLYCOHEMOGLOBIN: Status: RESOLVED | Noted: 2023-07-12 | Resolved: 2025-03-26

## 2025-03-26 PROBLEM — H16.229 KERATOCONJUNCTIVITIS SICCA, NOT SPECIFIED AS SJOGREN'S: Status: RESOLVED | Noted: 2023-07-12 | Resolved: 2025-03-26

## 2025-03-26 PROBLEM — H16.229 KERATOCONJUNCTIVITIS SICCA: Status: RESOLVED | Noted: 2023-07-12 | Resolved: 2025-03-26

## 2025-03-26 PROCEDURE — 92015 DETERMINE REFRACTIVE STATE: CPT | Performed by: OPHTHALMOLOGY

## 2025-03-26 PROCEDURE — 99214 OFFICE O/P EST MOD 30 MIN: CPT | Performed by: OPHTHALMOLOGY

## 2025-03-26 PROCEDURE — 99204 OFFICE O/P NEW MOD 45 MIN: CPT | Performed by: OPHTHALMOLOGY

## 2025-03-26 ASSESSMENT — REFRACTION_MANIFEST
OS_AXIS: 080
OD_SPHERE: +1.00
OS_SPHERE: +1.00
OS_AXIS: 075
OD_CYLINDER: -0.25
OD_AXIS: 100
OS_CYLINDER: -0.50
OD_AXIS: 090
OS_ADD: +2.25
OS_CYLINDER: -0.50
OD_CYLINDER: -0.25
METHOD_AUTOREFRACTION: 1
OS_SPHERE: +1.00
OD_SPHERE: +1.00
OD_ADD: +2.25

## 2025-03-26 ASSESSMENT — REFRACTION_WEARINGRX
OS_ADD: +2.25
OD_AXIS: 118
OD_ADD: +2.25
OS_AXIS: 083
OD_CYLINDER: -0.25
SPECS_TYPE: BIFOCAL
OS_SPHERE: +1.25
OD_SPHERE: +1.25
OS_CYLINDER: -0.50

## 2025-03-26 ASSESSMENT — ENCOUNTER SYMPTOMS
PSYCHIATRIC NEGATIVE: 0
ALLERGIC/IMMUNOLOGIC NEGATIVE: 0
CONSTITUTIONAL NEGATIVE: 0
NEUROLOGICAL NEGATIVE: 0
GASTROINTESTINAL NEGATIVE: 0
RESPIRATORY NEGATIVE: 0
HEMATOLOGIC/LYMPHATIC NEGATIVE: 0
ENDOCRINE NEGATIVE: 0
EYES NEGATIVE: 0
CARDIOVASCULAR NEGATIVE: 0
MUSCULOSKELETAL NEGATIVE: 0

## 2025-03-26 ASSESSMENT — SLIT LAMP EXAM - LIDS
COMMENTS: 1+ DERMATOCHALASIS - UPPER LID, 1+ BLEPHARITIS
COMMENTS: 1+ DERMATOCHALASIS - UPPER LID, 1+ BLEPHARITIS

## 2025-03-26 ASSESSMENT — VISUAL ACUITY
CORRECTION_TYPE: GLASSES
METHOD: SNELLEN - SINGLE
OS_CC: 20/20
OD_CC: 20/20
OD_CC+: -1

## 2025-03-26 ASSESSMENT — TONOMETRY
OS_IOP_MMHG: 13
OD_IOP_MMHG: 13
IOP_METHOD: GOLDMANN APPLANATION

## 2025-03-26 ASSESSMENT — KERATOMETRY
OS_K1POWER_DIOPTERS: 46.75
OS_K2POWER_DIOPTERS: 47.00
OS_AXISANGLE_DEGREES: 110
OD_AXISANGLE2_DEGREES: 175
OD_K2POWER_DIOPTERS: 47.25
METHOD_AUTO_MANUAL: AUTOMATED
OD_AXISANGLE_DEGREES: 85
OS_AXISANGLE2_DEGREES: 20
OD_K1POWER_DIOPTERS: 46.25

## 2025-03-26 ASSESSMENT — CUP TO DISC RATIO
OS_RATIO: 0.3
OD_RATIO: 0.3

## 2025-03-26 ASSESSMENT — PAIN SCALES - GENERAL: PAINLEVEL_OUTOF10: 0-NO PAIN

## 2025-03-26 ASSESSMENT — EXTERNAL EXAM - RIGHT EYE: OD_EXAM: NORMAL

## 2025-03-26 ASSESSMENT — EXTERNAL EXAM - LEFT EYE: OS_EXAM: NORMAL

## 2025-03-26 NOTE — PROGRESS NOTES
Subjective   Patient ID: Diana Garibay is a 59 y.o. female.    Chief Complaint    Annual Exam; New Patient Visit; Decreased Visual Acuity       HPI       Decreased Visual Acuity    Presenting in both eyes.  Context: distance vision and near vision.             Comments    Using Xiidra BID OU. LEYDI 2023 with Kwame.    Complete exam.  No recent changes in health history or meds.  Wants new specs.  Vision is a bit blurry.  No other complaints.            Last edited by Byron Jauregui MD on 3/26/2025 11:10 AM.        Current Outpatient Medications (Ophthalmology pharm classes)   Medication Sig Dispense Refill    lifitegrast (Xiidra) 5 % dropperette Administer 1 drop into affected eye(s) once daily. 60 each 11     Current Outpatient Medications (Other)   Medication Sig Dispense Refill    allopurinol (Zyloprim) 300 mg tablet TAKE 1 TABLET BY MOUTH EVERY DAY 90 tablet 3    celecoxib (CeleBREX) 200 mg capsule Take 1 capsule (200 mg) by mouth once daily as needed for moderate pain (4 - 6). 90 capsule 1    diclofenac sodium (Voltaren) 1 % gel APPLY A SMALL AMOUNT TO THE PAINFUL AREA 3 TIMES A DAY AS NEEDED 100 g 2    DULoxetine (Cymbalta) 20 mg DR capsule TAKE 1 CAPSULE (20 MG) BY MOUTH ONCE DAILY DO NOT CRUSH OR CHEW 90 capsule 3    ketoconazole (NIZOral) 2 % cream Apply topically 2 times a day. 60 g 3    levothyroxine (Synthroid, Levoxyl) 125 mcg tablet TAKE 1 TABLET BY MOUTH EVERY DAY 90 tablet 3    traZODone (Desyrel) 50 mg tablet 1/2-1 po hs prn insomnia 90 tablet 3    omeprazole (PriLOSEC) 20 mg DR capsule Take 1 capsule (20 mg) by mouth once daily in the morning. Take before meals for 5 days. Do not crush or chew. 5 capsule 0       Objective   Base Eye Exam       Visual Acuity (Snellen - Single)         Right Left Both    Dist cc 20/20 -1 20/20     Near cc   J1+      Correction: Glasses              Tonometry (Goldmann Applanation, 10:04 AM)         Right Left    Pressure 13 13              Pupils         Dark Shape  React APD    Right 4 Round 1 None    Left 4 Round 1 None              Extraocular Movement         Right Left     Full Full              Dilation       Both eyes: 1% Tropic 2.5% Phen @ 10:04 AM                  Additional Tests       Keratometry (Automated)         K1 Axis K2 Axis    Right 46.25 175 47.25 85    Left 46.75 20 47.00 110                  Slit Lamp and Fundus Exam       External Exam         Right Left    External Normal Normal              Slit Lamp Exam         Right Left    Lids/Lashes 1+ Dermatochalasis - upper lid, 1+ Blepharitis 1+ Dermatochalasis - upper lid, 1+ Blepharitis    Conjunctiva/Sclera White and quiet White and quiet    Cornea Clear Clear    Anterior Chamber Deep and quiet Deep and quiet    Iris Round and reactive Round and reactive    Lens Trace Nuclear sclerosis Trace Nuclear sclerosis    Anterior Vitreous Vitreous syneresis Vitreous syneresis              Fundus Exam         Right Left    Disc Normal Normal    C/D Ratio 0.3 0.3    Macula Normal Normal    Vessels Normal Normal    Periphery Normal Normal    Challenging exam.                  Refraction       Wearing Rx         Sphere Cylinder Axis Add    Right +1.25 -0.25 118 +2.25    Left +1.25 -0.50 083 +2.25      Type: Bifocal              Manifest Refraction (Auto)         Sphere Cylinder [place] Dist VA Add Near VA    Right +1.00 -0.25 090       Left +1.00 -0.50 075         Pupillary Distance: 60              Manifest Refraction #2 (Subjective)         Sphere Cylinder [place] Dist VA Add Near VA    Right +1.00 -0.25 100 20/20 +2.25 J1+    Left +1.00 -0.50 080 20/20 +2.25 J1+      Pupillary Distance: 60              Final Rx         Sphere Cylinder [place] Dist VA Add Near VA    Right +1.00 -0.25 100 20/20 +2.25 J1+    Left +1.00 -0.50 080 20/20 +2.25 J1+      Type: Bifocal    Expiration Date: 3/26/2027    Pupillary Distance: 60                    Assessment/Plan   Problem List Items Addressed This Visit          Eye/Vision problems     Refractive error     Spec rx given.  F/u 2 years full.          Dry eyes    Squamous blepharitis of upper and lower eyelids of both eyes    Combined forms of age-related cataract of both eyes - Primary

## 2025-04-15 DIAGNOSIS — E03.9 HYPOTHYROIDISM, UNSPECIFIED: ICD-10-CM

## 2025-04-15 RX ORDER — LEVOTHYROXINE SODIUM 125 UG/1
125 TABLET ORAL DAILY
Qty: 90 TABLET | Refills: 3 | Status: SHIPPED | OUTPATIENT
Start: 2025-04-15

## 2025-05-16 DIAGNOSIS — Z00.00 ROUTINE GENERAL MEDICAL EXAMINATION AT A HEALTH CARE FACILITY: Primary | ICD-10-CM

## 2025-06-03 LAB
ALBUMIN SERPL-MCNC: 4.5 G/DL (ref 3.6–5.1)
ALP SERPL-CCNC: 88 U/L (ref 37–153)
ALT SERPL-CCNC: 20 U/L (ref 6–29)
ANION GAP SERPL CALCULATED.4IONS-SCNC: 10 MMOL/L (CALC) (ref 7–17)
APPEARANCE UR: CLEAR
AST SERPL-CCNC: 17 U/L (ref 10–35)
BACTERIA #/AREA URNS HPF: ABNORMAL /HPF
BASOPHILS # BLD AUTO: 21 CELLS/UL (ref 0–200)
BASOPHILS NFR BLD AUTO: 0.3 %
BILIRUB SERPL-MCNC: 0.8 MG/DL (ref 0.2–1.2)
BILIRUB UR QL STRIP: NEGATIVE
BUN SERPL-MCNC: 16 MG/DL (ref 7–25)
CALCIUM SERPL-MCNC: 9.6 MG/DL (ref 8.6–10.4)
CHLORIDE SERPL-SCNC: 104 MMOL/L (ref 98–110)
CHOLEST SERPL-MCNC: 275 MG/DL
CHOLEST/HDLC SERPL: 6.3 (CALC)
CO2 SERPL-SCNC: 27 MMOL/L (ref 20–32)
COLOR UR: YELLOW
CREAT SERPL-MCNC: 0.8 MG/DL (ref 0.5–1.05)
EGFRCR SERPLBLD CKD-EPI 2021: 84 ML/MIN/1.73M2
EOSINOPHIL # BLD AUTO: 110 CELLS/UL (ref 15–500)
EOSINOPHIL NFR BLD AUTO: 1.6 %
ERYTHROCYTE [DISTWIDTH] IN BLOOD BY AUTOMATED COUNT: 13.5 % (ref 11–15)
EST. AVERAGE GLUCOSE BLD GHB EST-MCNC: 123 MG/DL
EST. AVERAGE GLUCOSE BLD GHB EST-SCNC: 6.8 MMOL/L
GLUCOSE SERPL-MCNC: 99 MG/DL (ref 65–99)
GLUCOSE UR QL STRIP: NEGATIVE
HBA1C MFR BLD: 5.9 %
HCT VFR BLD AUTO: 41.2 % (ref 35–45)
HDLC SERPL-MCNC: 44 MG/DL
HGB BLD-MCNC: 13.1 G/DL (ref 11.7–15.5)
HGB UR QL STRIP: NEGATIVE
HYALINE CASTS #/AREA URNS LPF: ABNORMAL /LPF
KETONES UR QL STRIP: NEGATIVE
LDLC SERPL CALC-MCNC: 196 MG/DL (CALC)
LEUKOCYTE ESTERASE UR QL STRIP: ABNORMAL
LYMPHOCYTES # BLD AUTO: 2298 CELLS/UL (ref 850–3900)
LYMPHOCYTES NFR BLD AUTO: 33.3 %
MCH RBC QN AUTO: 29 PG (ref 27–33)
MCHC RBC AUTO-ENTMCNC: 31.8 G/DL (ref 32–36)
MCV RBC AUTO: 91.2 FL (ref 80–100)
MONOCYTES # BLD AUTO: 469 CELLS/UL (ref 200–950)
MONOCYTES NFR BLD AUTO: 6.8 %
NEUTROPHILS # BLD AUTO: 4002 CELLS/UL (ref 1500–7800)
NEUTROPHILS NFR BLD AUTO: 58 %
NITRITE UR QL STRIP: NEGATIVE
NONHDLC SERPL-MCNC: 231 MG/DL (CALC)
PH UR STRIP: 6.5 [PH] (ref 5–8)
PLATELET # BLD AUTO: 266 THOUSAND/UL (ref 140–400)
PMV BLD REES-ECKER: 9.8 FL (ref 7.5–12.5)
POTASSIUM SERPL-SCNC: 5.3 MMOL/L (ref 3.5–5.3)
PROT SERPL-MCNC: 7 G/DL (ref 6.1–8.1)
PROT UR QL STRIP: NEGATIVE
RBC # BLD AUTO: 4.52 MILLION/UL (ref 3.8–5.1)
RBC #/AREA URNS HPF: ABNORMAL /HPF
SERVICE CMNT-IMP: ABNORMAL
SODIUM SERPL-SCNC: 141 MMOL/L (ref 135–146)
SP GR UR STRIP: 1 (ref 1–1.03)
SQUAMOUS #/AREA URNS HPF: ABNORMAL /HPF
TRIGL SERPL-MCNC: 176 MG/DL
TSH SERPL-ACNC: 4.78 MIU/L (ref 0.4–4.5)
WBC # BLD AUTO: 6.9 THOUSAND/UL (ref 3.8–10.8)
WBC #/AREA URNS HPF: ABNORMAL /HPF

## 2025-06-05 ASSESSMENT — PROMIS GLOBAL HEALTH SCALE
RATE_MENTAL_HEALTH: FAIR
RATE_PHYSICAL_HEALTH: FAIR
RATE_QUALITY_OF_LIFE: FAIR
EMOTIONAL_PROBLEMS: SOMETIMES
RATE_AVERAGE_PAIN: 5
CARRYOUT_SOCIAL_ACTIVITIES: FAIR
CARRYOUT_PHYSICAL_ACTIVITIES: MODERATELY
RATE_AVERAGE_FATIGUE: SEVERE
RATE_SOCIAL_SATISFACTION: POOR
RATE_GENERAL_HEALTH: FAIR

## 2025-06-06 ENCOUNTER — APPOINTMENT (OUTPATIENT)
Dept: PRIMARY CARE | Facility: CLINIC | Age: 60
End: 2025-06-06
Payer: COMMERCIAL

## 2025-06-06 VITALS
SYSTOLIC BLOOD PRESSURE: 120 MMHG | BODY MASS INDEX: 47.48 KG/M2 | TEMPERATURE: 96.4 F | WEIGHT: 268 LBS | DIASTOLIC BLOOD PRESSURE: 70 MMHG | HEART RATE: 78 BPM | OXYGEN SATURATION: 98 % | HEIGHT: 63 IN

## 2025-06-06 DIAGNOSIS — Z78.0 POSTMENOPAUSAL: ICD-10-CM

## 2025-06-06 DIAGNOSIS — M35.01 SJOGREN SYNDROME WITH KERATOCONJUNCTIVITIS: ICD-10-CM

## 2025-06-06 DIAGNOSIS — E03.9 ACQUIRED HYPOTHYROIDISM: ICD-10-CM

## 2025-06-06 DIAGNOSIS — E78.5 DYSLIPIDEMIA: Primary | ICD-10-CM

## 2025-06-06 DIAGNOSIS — I10 PRIMARY HYPERTENSION: ICD-10-CM

## 2025-06-06 DIAGNOSIS — Z23 NEED FOR PNEUMOCOCCAL VACCINATION: ICD-10-CM

## 2025-06-06 DIAGNOSIS — Z12.31 BREAST CANCER SCREENING BY MAMMOGRAM: ICD-10-CM

## 2025-06-06 PROCEDURE — 3008F BODY MASS INDEX DOCD: CPT | Performed by: FAMILY MEDICINE

## 2025-06-06 PROCEDURE — 3074F SYST BP LT 130 MM HG: CPT | Performed by: FAMILY MEDICINE

## 2025-06-06 PROCEDURE — 3078F DIAST BP <80 MM HG: CPT | Performed by: FAMILY MEDICINE

## 2025-06-06 PROCEDURE — 90471 IMMUNIZATION ADMIN: CPT | Performed by: FAMILY MEDICINE

## 2025-06-06 PROCEDURE — 99396 PREV VISIT EST AGE 40-64: CPT | Performed by: FAMILY MEDICINE

## 2025-06-06 PROCEDURE — 90677 PCV20 VACCINE IM: CPT | Performed by: FAMILY MEDICINE

## 2025-06-06 PROCEDURE — 1036F TOBACCO NON-USER: CPT | Performed by: FAMILY MEDICINE

## 2025-06-06 RX ORDER — PROGESTERONE 200 MG/1
200 CAPSULE ORAL DAILY
Qty: 30 CAPSULE | Refills: 11 | Status: SHIPPED | OUTPATIENT
Start: 2025-06-06 | End: 2026-06-06

## 2025-06-06 RX ORDER — EVOLOCUMAB 140 MG/ML
140 INJECTION, SOLUTION SUBCUTANEOUS
Qty: 6 ML | Refills: 3 | Status: SHIPPED | OUTPATIENT
Start: 2025-06-06

## 2025-06-06 RX ORDER — ESTRADIOL 0.5 MG/1
0.5 TABLET ORAL DAILY
Qty: 30 TABLET | Refills: 11 | Status: SHIPPED | OUTPATIENT
Start: 2025-06-06 | End: 2026-06-06

## 2025-06-06 RX ORDER — LEVOTHYROXINE SODIUM 150 UG/1
150 TABLET ORAL DAILY
Qty: 30 TABLET | Refills: 11 | Status: SHIPPED | OUTPATIENT
Start: 2025-06-06 | End: 2026-06-06

## 2025-06-06 ASSESSMENT — ENCOUNTER SYMPTOMS
MYALGIAS: 1
FEVER: 0
WEAKNESS: 0
JOINT SWELLING: 0
BLOOD IN STOOL: 0
ABDOMINAL PAIN: 0
NAUSEA: 0
VOMITING: 0
COUGH: 0
FATIGUE: 1
LIGHT-HEADEDNESS: 0
UNEXPECTED WEIGHT CHANGE: 0
BRUISES/BLEEDS EASILY: 0
TREMORS: 0
HEMATURIA: 0
DIARRHEA: 0
TROUBLE SWALLOWING: 0
DYSPHORIC MOOD: 0
DYSURIA: 0
SHORTNESS OF BREATH: 0
PALPITATIONS: 0
SINUS PAIN: 0
ARTHRALGIAS: 1

## 2025-06-06 ASSESSMENT — PAIN SCALES - GENERAL: PAINLEVEL_OUTOF10: 0-NO PAIN

## 2025-06-06 NOTE — PROGRESS NOTES
"Subjective   Patient ID: Parvin Garibay \"Sergo" is a 60 y.o. female.    Parvin Garibya \"Sergo" comes in today for physical exam.  There has been no chest pain, shortness of breath, fever, chills, unexplained weight loss, rectal bleeding.  She has dyslipidemia with LDLs over 200 and is intolerant to multiple statins.  TSH is elevated and she has been significantly fatigued and achy.  We reviewed past dosages of thyroid.  She is having night sweats from menopause and is at her wits end and would like to try hormone therapy.  No personal or family history of breast cancer and we discussed risks and benefits.Body mass index is 47.47 kg/m².  Recent labs, diagnostics and pertinent information has been reviewed. Patient is attempting to eat a healthy diet and incorporate exercise in to their lifestyle. Patient does not smoke. Alcohol in moderation. Patient is practicing routine eye and dental care. Reviewed employment status. No uncontrolled anxiety, depression. Reviewed current medications, if any.          Review of Systems   Constitutional:  Positive for fatigue. Negative for fever and unexpected weight change.   HENT:  Negative for congestion, ear pain, nosebleeds, sinus pain and trouble swallowing.    Eyes:  Negative for visual disturbance.   Respiratory:  Negative for cough and shortness of breath.    Cardiovascular:  Negative for chest pain and palpitations.   Gastrointestinal:  Negative for abdominal pain, blood in stool, diarrhea, nausea and vomiting.   Genitourinary:  Negative for dysuria and hematuria.   Musculoskeletal:  Positive for arthralgias and myalgias. Negative for gait problem and joint swelling.   Neurological:  Negative for tremors, weakness and light-headedness.   Hematological:  Does not bruise/bleed easily.   Psychiatric/Behavioral:  Negative for dysphoric mood and suicidal ideas.      Vitals:    06/06/25 0838   BP: 120/70   Pulse: 78   Temp: 35.8 °C (96.4 °F)   SpO2: 98%      Body mass index is " 47.47 kg/m².  Objective   Physical Exam  Constitutional:       Appearance: Normal appearance. She is obese.   HENT:      Head: Normocephalic and atraumatic.      Right Ear: Tympanic membrane and external ear normal.      Left Ear: Tympanic membrane and external ear normal.      Nose: Nose normal.      Mouth/Throat:      Mouth: Mucous membranes are moist.      Pharynx: Oropharynx is clear. No oropharyngeal exudate.   Eyes:      Extraocular Movements: Extraocular movements intact.      Conjunctiva/sclera: Conjunctivae normal.      Pupils: Pupils are equal, round, and reactive to light.   Cardiovascular:      Rate and Rhythm: Normal rate and regular rhythm.      Heart sounds: Normal heart sounds.   Pulmonary:      Effort: Pulmonary effort is normal.      Breath sounds: Normal breath sounds.   Abdominal:      General: Abdomen is flat.      Palpations: Abdomen is soft. There is no mass.      Tenderness: There is no abdominal tenderness. There is no guarding.   Musculoskeletal:      Cervical back: Neck supple.   Lymphadenopathy:      Cervical: No cervical adenopathy.   Skin:     General: Skin is warm and dry.   Neurological:      General: No focal deficit present.      Mental Status: She is alert.   Psychiatric:         Mood and Affect: Mood normal.         Speech: Speech normal.         Behavior: Behavior normal.         Cognition and Memory: Cognition normal.         Last Labs:     CMP:   Lab Results   Component Value Date    CALCIUM 9.6 06/02/2025    CALCIUM 9.4 06/17/2024    CALCIUM 9.8 09/23/2023    CALCIUM 9.7 06/20/2022    PROT 7.0 06/02/2025    PROT 6.4 09/23/2023    PROT 6.6 06/20/2022    ALBUMIN 4.5 06/02/2025    ALBUMIN 4.2 09/23/2023    ALBUMIN 3.9 06/20/2022    AST 17 06/02/2025    AST 24 09/23/2023    AST 19 06/20/2022    ALKPHOS 88 06/02/2025    ALKPHOS 93 09/23/2023    ALKPHOS 96 06/20/2022    BILITOT 0.8 06/02/2025    BILITOT 0.6 09/23/2023    BILITOT 0.5 06/20/2022     CBC:   Lab Results   Component  "Value Date    WBC 6.9 06/02/2025    WBC 7.7 06/17/2024    WBC 6.3 09/23/2023    WBC 8.8 01/27/2023    HGB 13.1 06/02/2025    HGB 12.7 06/17/2024    HGB 13.4 09/23/2023    HGB 12.9 01/27/2023    HCT 41.2 06/02/2025    HCT 38.1 06/17/2024    HCT 41.6 09/23/2023    HCT 39.9 01/27/2023    MCV 91.2 06/02/2025    MCV 88 06/17/2024    MCV 92 09/23/2023    MCV 90 01/27/2023     06/02/2025     06/17/2024     09/23/2023     01/27/2023     A1C:   Lab Results   Component Value Date    HGBA1C 5.9 (H) 06/02/2025    HGBA1C 5.4 09/23/2023    HGBA1C 5.5 06/20/2022     LIPID PANEL:   Lab Results   Component Value Date    CHOL 275 (H) 06/02/2025    CHOL 282 (H) 09/23/2023    CHOL 231 (H) 06/20/2022    TRIG 176 (H) 06/02/2025    TRIG 218 (H) 09/23/2023    TRIG 230 (H) 06/20/2022    HDL 44 (L) 06/02/2025    HDL 41.2 09/23/2023    HDL 37.9 (A) 06/20/2022    CHHDL 6.3 (H) 06/02/2025    CHHDL 6.8 (A) 09/23/2023    CHHDL 6.1 (A) 06/20/2022    LDLF 197 (H) 09/23/2023    LDLF 147 (H) 06/20/2022    VLDL 44 (H) 09/23/2023    VLDL 46 (H) 06/20/2022    NHDL 231 (H) 06/02/2025    NHDL 241 09/23/2023    NHDL 193 06/20/2022     TSH:   Lab Results   Component Value Date    TSH 4.78 (H) 06/02/2025    TSH 2.87 06/17/2024    TSH 0.30 (L) 12/02/2023     PSA:   No results found for: \"PSA\"     Assessment/Plan   There are no diagnoses linked to this encounter.    "

## 2025-06-06 NOTE — PATIENT INSTRUCTIONS
It was nice to see you today!  Discussed current concerns and addressed   Reviewed recent labs and diagnostics  Reviewed medications list  Continue to eat a healthy diet, exercise at least 3 times a week or more  Plan and follow up discussed  For any further information related to your condition, copy and paste or go to [email or web address]  Start estradiol 0.5 mg and Prometrium 200 mg p.o. daily, discussed risk and benefit  She needs to get a mammogram if we are to do HRT  Pneumococcal vaccine per new guidelines  Recheck TSH in 2 months after increasing levothyroxine to 150 mcg daily  Work on weight loss

## 2025-06-16 ENCOUNTER — APPOINTMENT (OUTPATIENT)
Dept: RADIOLOGY | Facility: CLINIC | Age: 60
End: 2025-06-16
Payer: COMMERCIAL

## 2025-06-16 VITALS — BODY MASS INDEX: 47.48 KG/M2 | HEIGHT: 63 IN | WEIGHT: 268 LBS

## 2025-06-16 DIAGNOSIS — Z12.31 BREAST CANCER SCREENING BY MAMMOGRAM: ICD-10-CM

## 2025-06-16 PROCEDURE — 77067 SCR MAMMO BI INCL CAD: CPT | Performed by: RADIOLOGY

## 2025-06-16 PROCEDURE — 77063 BREAST TOMOSYNTHESIS BI: CPT | Performed by: RADIOLOGY

## 2025-06-16 PROCEDURE — 77067 SCR MAMMO BI INCL CAD: CPT

## 2025-06-29 DIAGNOSIS — Z78.0 POSTMENOPAUSAL: ICD-10-CM

## 2025-06-30 RX ORDER — ESTRADIOL 0.5 MG/1
0.5 TABLET ORAL DAILY
Qty: 90 TABLET | Refills: 4 | Status: SHIPPED | OUTPATIENT
Start: 2025-06-30

## 2025-07-01 DIAGNOSIS — T46.6X5A: Primary | ICD-10-CM

## 2025-07-11 ENCOUNTER — APPOINTMENT (OUTPATIENT)
Dept: GASTROENTEROLOGY | Facility: HOSPITAL | Age: 60
End: 2025-07-11
Payer: COMMERCIAL

## 2025-07-15 DIAGNOSIS — H04.123 DRY EYES: ICD-10-CM

## 2025-07-15 RX ORDER — LIFITEGRAST 50 MG/ML
1 SOLUTION/ DROPS OPHTHALMIC DAILY
Qty: 60 EACH | Refills: 11 | Status: SHIPPED | OUTPATIENT
Start: 2025-07-15

## 2025-07-24 DIAGNOSIS — H04.123 DRY EYES: ICD-10-CM

## 2025-07-24 RX ORDER — LIFITEGRAST 50 MG/ML
1 SOLUTION/ DROPS OPHTHALMIC DAILY
Qty: 60 EACH | Refills: 11 | Status: SHIPPED | OUTPATIENT
Start: 2025-07-24

## 2025-08-02 LAB
ALBUMIN SERPL-MCNC: 4.2 G/DL (ref 3.6–5.1)
ALP SERPL-CCNC: 86 U/L (ref 37–153)
ALT SERPL-CCNC: 14 U/L (ref 6–29)
ANION GAP SERPL CALCULATED.4IONS-SCNC: 12 MMOL/L (CALC) (ref 7–17)
AST SERPL-CCNC: 15 U/L (ref 10–35)
BASOPHILS # BLD AUTO: 39 CELLS/UL (ref 0–200)
BASOPHILS NFR BLD AUTO: 0.5 %
BILIRUB SERPL-MCNC: 0.6 MG/DL (ref 0.2–1.2)
BUN SERPL-MCNC: 15 MG/DL (ref 7–25)
CALCIUM SERPL-MCNC: 9.3 MG/DL (ref 8.6–10.4)
CHLORIDE SERPL-SCNC: 103 MMOL/L (ref 98–110)
CHOLEST SERPL-MCNC: 232 MG/DL
CHOLEST/HDLC SERPL: 5.4 (CALC)
CK SERPL-CCNC: 85 U/L (ref 20–243)
CO2 SERPL-SCNC: 25 MMOL/L (ref 20–32)
CREAT SERPL-MCNC: 0.78 MG/DL (ref 0.5–1.05)
EGFRCR SERPLBLD CKD-EPI 2021: 87 ML/MIN/1.73M2
EOSINOPHIL # BLD AUTO: 109 CELLS/UL (ref 15–500)
EOSINOPHIL NFR BLD AUTO: 1.4 %
ERYTHROCYTE [DISTWIDTH] IN BLOOD BY AUTOMATED COUNT: 13.3 % (ref 11–15)
GLUCOSE SERPL-MCNC: 91 MG/DL (ref 65–99)
HCT VFR BLD AUTO: 38.8 % (ref 35–45)
HDLC SERPL-MCNC: 43 MG/DL
HGB BLD-MCNC: 12.9 G/DL (ref 11.7–15.5)
LDLC SERPL CALC-MCNC: 154 MG/DL (CALC)
LYMPHOCYTES # BLD AUTO: 2473 CELLS/UL (ref 850–3900)
LYMPHOCYTES NFR BLD AUTO: 31.7 %
MCH RBC QN AUTO: 30.6 PG (ref 27–33)
MCHC RBC AUTO-ENTMCNC: 33.2 G/DL (ref 32–36)
MCV RBC AUTO: 92.2 FL (ref 80–100)
MONOCYTES # BLD AUTO: 546 CELLS/UL (ref 200–950)
MONOCYTES NFR BLD AUTO: 7 %
NEUTROPHILS # BLD AUTO: 4633 CELLS/UL (ref 1500–7800)
NEUTROPHILS NFR BLD AUTO: 59.4 %
NONHDLC SERPL-MCNC: 189 MG/DL (CALC)
PLATELET # BLD AUTO: 290 THOUSAND/UL (ref 140–400)
PMV BLD REES-ECKER: 9.7 FL (ref 7.5–12.5)
POTASSIUM SERPL-SCNC: 4.3 MMOL/L (ref 3.5–5.3)
PROT SERPL-MCNC: 6.7 G/DL (ref 6.1–8.1)
RBC # BLD AUTO: 4.21 MILLION/UL (ref 3.8–5.1)
SODIUM SERPL-SCNC: 140 MMOL/L (ref 135–146)
TRIGL SERPL-MCNC: 209 MG/DL
TSH SERPL-ACNC: 2.16 MIU/L (ref 0.4–4.5)
WBC # BLD AUTO: 7.8 THOUSAND/UL (ref 3.8–10.8)

## 2025-08-03 LAB — B BURGDOR IGG+IGM SER QL IA: <=0.9 INDEX

## 2025-08-20 ENCOUNTER — APPOINTMENT (OUTPATIENT)
Dept: SLEEP MEDICINE | Facility: CLINIC | Age: 60
End: 2025-08-20
Payer: COMMERCIAL

## 2025-08-25 RX ORDER — POLYETHYLENE GLYCOL 3350, SODIUM CHLORIDE, SODIUM BICARBONATE, POTASSIUM CHLORIDE 420; 11.2; 5.72; 1.48 G/4L; G/4L; G/4L; G/4L
4000 POWDER, FOR SOLUTION ORAL
COMMUNITY
Start: 2025-01-10

## 2025-08-28 ENCOUNTER — ANESTHESIA EVENT (OUTPATIENT)
Dept: GASTROENTEROLOGY | Facility: HOSPITAL | Age: 60
End: 2025-08-28
Payer: COMMERCIAL

## 2025-08-29 ENCOUNTER — HOSPITAL ENCOUNTER (OUTPATIENT)
Dept: GASTROENTEROLOGY | Facility: HOSPITAL | Age: 60
Discharge: HOME | End: 2025-08-29
Payer: COMMERCIAL

## 2025-08-29 ENCOUNTER — ANESTHESIA (OUTPATIENT)
Dept: GASTROENTEROLOGY | Facility: HOSPITAL | Age: 60
End: 2025-08-29
Payer: COMMERCIAL

## 2025-08-29 VITALS
SYSTOLIC BLOOD PRESSURE: 142 MMHG | TEMPERATURE: 97.7 F | BODY MASS INDEX: 46.08 KG/M2 | WEIGHT: 260.14 LBS | OXYGEN SATURATION: 99 % | DIASTOLIC BLOOD PRESSURE: 74 MMHG | HEART RATE: 65 BPM | RESPIRATION RATE: 15 BRPM

## 2025-08-29 DIAGNOSIS — Z12.11 COLON CANCER SCREENING: ICD-10-CM

## 2025-08-29 PROCEDURE — 7100000009 HC PHASE TWO TIME - INITIAL BASE CHARGE

## 2025-08-29 PROCEDURE — 45385 COLONOSCOPY W/LESION REMOVAL: CPT | Performed by: INTERNAL MEDICINE

## 2025-08-29 PROCEDURE — 45390 COLONOSCOPY W/RESECTION: CPT | Performed by: INTERNAL MEDICINE

## 2025-08-29 PROCEDURE — 2500000004 HC RX 250 GENERAL PHARMACY W/ HCPCS (ALT 636 FOR OP/ED): Performed by: ANESTHESIOLOGIST ASSISTANT

## 2025-08-29 PROCEDURE — 7100000010 HC PHASE TWO TIME - EACH INCREMENTAL 1 MINUTE

## 2025-08-29 PROCEDURE — 3700000001 HC GENERAL ANESTHESIA TIME - INITIAL BASE CHARGE

## 2025-08-29 PROCEDURE — 3700000002 HC GENERAL ANESTHESIA TIME - EACH INCREMENTAL 1 MINUTE

## 2025-08-29 PROCEDURE — 2720000007 HC OR 272 NO HCPCS

## 2025-08-29 RX ORDER — LIDOCAINE HYDROCHLORIDE 20 MG/ML
INJECTION, SOLUTION EPIDURAL; INFILTRATION; INTRACAUDAL; PERINEURAL AS NEEDED
Status: DISCONTINUED | OUTPATIENT
Start: 2025-08-29 | End: 2025-08-29

## 2025-08-29 RX ORDER — MIDAZOLAM HYDROCHLORIDE 2 MG/2ML
INJECTION, SOLUTION INTRAMUSCULAR; INTRAVENOUS AS NEEDED
Status: DISCONTINUED | OUTPATIENT
Start: 2025-08-29 | End: 2025-08-29

## 2025-08-29 RX ORDER — PROPOFOL 10 MG/ML
INJECTION, EMULSION INTRAVENOUS AS NEEDED
Status: DISCONTINUED | OUTPATIENT
Start: 2025-08-29 | End: 2025-08-29

## 2025-08-29 RX ADMIN — SODIUM CHLORIDE: 9 INJECTION, SOLUTION INTRAVENOUS at 07:42

## 2025-08-29 RX ADMIN — PROPOFOL 50 MG: 10 INJECTION, EMULSION INTRAVENOUS at 07:43

## 2025-08-29 RX ADMIN — MIDAZOLAM HYDROCHLORIDE 2 MG: 1 INJECTION, SOLUTION INTRAMUSCULAR; INTRAVENOUS at 07:40

## 2025-08-29 RX ADMIN — PROPOFOL 200 MCG/KG/MIN: 10 INJECTION, EMULSION INTRAVENOUS at 07:44

## 2025-08-29 RX ADMIN — LIDOCAINE HYDROCHLORIDE 100 MG: 20 INJECTION, SOLUTION EPIDURAL; INFILTRATION; INTRACAUDAL; PERINEURAL at 07:43

## 2025-08-29 RX ADMIN — GLYCOPYRROLATE 0.1 MG: 0.2 INJECTION, SOLUTION INTRAMUSCULAR; INTRAVENOUS at 07:40

## 2025-08-29 ASSESSMENT — PAIN - FUNCTIONAL ASSESSMENT
PAIN_FUNCTIONAL_ASSESSMENT: 0-10

## 2025-08-29 ASSESSMENT — PAIN SCALES - GENERAL
PAINLEVEL_OUTOF10: 0 - NO PAIN

## 2025-09-02 ENCOUNTER — APPOINTMENT (OUTPATIENT)
Dept: PRIMARY CARE | Facility: CLINIC | Age: 60
End: 2025-09-02
Payer: COMMERCIAL

## 2025-09-02 DIAGNOSIS — D12.6 COLON ADENOMA: Primary | ICD-10-CM

## 2025-09-05 LAB
LABORATORY COMMENT REPORT: NORMAL
PATH REPORT.FINAL DX SPEC: NORMAL
PATH REPORT.GROSS SPEC: NORMAL
PATH REPORT.MICROSCOPIC SPEC OTHER STN: NORMAL
PATH REPORT.RELEVANT HX SPEC: NORMAL
PATH REPORT.TOTAL CANCER: NORMAL

## 2025-12-05 ENCOUNTER — APPOINTMENT (OUTPATIENT)
Dept: ENDOCRINOLOGY | Facility: CLINIC | Age: 60
End: 2025-12-05
Payer: COMMERCIAL

## 2026-06-08 ENCOUNTER — APPOINTMENT (OUTPATIENT)
Dept: PRIMARY CARE | Facility: CLINIC | Age: 61
End: 2026-06-08
Payer: COMMERCIAL

## (undated) DEVICE — Device

## (undated) DEVICE — CANNULA, TRIPLE-DAM TWIST-IN, 7MM X 7CM, VALVED

## (undated) DEVICE — SUTURE, MONOCRYL, 3-0, 27 IN, PS-2, UNDYED

## (undated) DEVICE — SUTURE, ETHILON, 3-0, 18 IN, PS1, BLACK

## (undated) DEVICE — DRESSING, GAUZE, PETROLATUM, PATCH, XEROFORM, 1 X 8 IN, STERILE

## (undated) DEVICE — TUBING, SUCTION, 6MM X 10, CLEAN N-COND

## (undated) DEVICE — TUBING, DUAL WAVE, OUTFLOW

## (undated) DEVICE — GLOVE, SURGICAL, PROTEXIS PI ORTHO, 8.0, PF, LF

## (undated) DEVICE — BONECUTTER, COOLCUT TORPEDO, 4.0MM X 13CM

## (undated) DEVICE — EXCAL 4MM X 13CM SINGLE

## (undated) DEVICE — HD SCORPION NEEDLE (5 PACK)

## (undated) DEVICE — DRESSING, ABDOMINAL, WET PRUF, TENDERSORB, 5 X 9 IN, STERILE

## (undated) DEVICE — KIT, BEACH CHAIR TRIMANO

## (undated) DEVICE — TUBING, PUMP MAIN 16FT STERILE

## (undated) DEVICE — GLOVE, SURGICAL, PROTEXIS PI , 6.5, PF, LF

## (undated) DEVICE — BLANKET, LOWER BODY, VHA PLUS, ADULT

## (undated) DEVICE — PROBE, APOLLO RF, 90 DEG, EXTRA LARTGE

## (undated) DEVICE — DRESSING, TRANSPARENT, TEGADERM, FRAME STYLE, 4 X 4.5, STRL

## (undated) DEVICE — CANNULA, TWIST-IN,  6MM X 7CM

## (undated) DEVICE — GOWN, SURGICAL, SIRUS, NON REINFORCED, LARGE

## (undated) DEVICE — GLOVE, SURGICAL, PROTEXIS PI BLUE W/NEUTHERA, 6.5, PF, LF

## (undated) DEVICE — STRIP, SKIN CLOSURE, STERI STRIP, REINFORCED, 0.5 X 4 IN

## (undated) DEVICE — SUTURE, VICRYL, 2-0, 27 IN, SH, UNDYED

## (undated) DEVICE — GLOVE, SURGICAL, PROTEXIS PI ORTHO, 7.5, PF, LF